# Patient Record
Sex: FEMALE | Race: WHITE | NOT HISPANIC OR LATINO | Employment: FULL TIME | URBAN - METROPOLITAN AREA
[De-identification: names, ages, dates, MRNs, and addresses within clinical notes are randomized per-mention and may not be internally consistent; named-entity substitution may affect disease eponyms.]

---

## 2018-09-26 ENCOUNTER — OFFICE VISIT (OUTPATIENT)
Dept: OBSTETRICS AND GYNECOLOGY | Age: 28
End: 2018-09-26

## 2018-09-26 VITALS
BODY MASS INDEX: 21.59 KG/M2 | SYSTOLIC BLOOD PRESSURE: 110 MMHG | HEIGHT: 56 IN | WEIGHT: 96 LBS | DIASTOLIC BLOOD PRESSURE: 64 MMHG

## 2018-09-26 DIAGNOSIS — Z01.419 ENCOUNTER FOR GYNECOLOGICAL EXAMINATION WITHOUT ABNORMAL FINDING: Primary | ICD-10-CM

## 2018-09-26 PROCEDURE — 99395 PREV VISIT EST AGE 18-39: CPT | Performed by: PHYSICIAN ASSISTANT

## 2018-09-26 RX ORDER — CETIRIZINE HYDROCHLORIDE 10 MG/1
10 TABLET ORAL DAILY
COMMUNITY
End: 2022-12-28

## 2018-09-26 NOTE — PROGRESS NOTES
"Subjective     Chief Complaint   Patient presents with   • Gynecologic Exam     annual exam. last pap 2014 neg/no hpv       History of Present Illness    Lolis Ruiz is a 28 y.o.  who presents for annual exam.    Pt has not been seen since 2016  Mom recently dxed with precancerous cells of her cervix  Prompted pt to call for her appt  No h/o abnormal paps  Never received gardasil  Currently using condoms for BC, doesn't want to do anything else  No med hx  Pt of Dr Howe    Her menses are regular every 28-30 days, lasting 4-7 days, dysmenorrhea none   Obstetric History:  OB History      Para Term  AB Living    2 2 2     2    SAB TAB Ectopic Molar Multiple Live Births              2         Menstrual History:     Patient's last menstrual period was 2018 (exact date).         Current contraception: condoms  History of abnormal Pap smear: no  Received Gardasil immunization: no  Perform regular self breast exam: yes - occl  Family history of uterine or ovarian cancer: no  Family History of colon cancer: no  Family history of breast cancer: yes - MGM in her 60's    Mammogram: not indicated.  Colonoscopy: not indicated.  DEXA: not indicated.    Exercise: moderately active  Calcium/Vitamin D: adequate intake    The following portions of the patient's history were reviewed and updated as appropriate: allergies, current medications, past family history, past medical history, past social history, past surgical history and problem list.    Review of Systems   All other systems reviewed and are negative.      Review of Systems   Constitutional: Negative for fatigue.   Respiratory: Negative for shortness of breath.    Gastrointestinal: Negative for abdominal pain.   Genitourinary: Negative for dysuria.   Neurological: Negative for headaches.   Psychiatric/Behavioral: Negative for dysphoric mood.         Objective   Physical Exam    /64   Ht 142.2 cm (56\")   Wt 43.5 kg (96 lb)   LMP " 09/02/2018 (Exact Date)   Breastfeeding? No   BMI 21.52 kg/m²     General:   alert, appears stated age and cooperative   Neck: no adenopathy and thyroid normal to palpation   Heart: regular rate and rhythm   Lungs: clear to auscultation bilaterally   Abdomen: soft and nontender   Breast: inspection negative, no nipple discharge or bleeding, no masses or nodularity palpable and B dense breasts   Vulva: normal   Vagina: normal mucosa, normal discharge   Cervix: no lesions   Uterus: normal size, non-tender   Adnexa: normal adnexa and no mass, fullness, tenderness   Rectal: not indicated     Assessment/Plan   Lolis was seen today for gynecologic exam.    Diagnoses and all orders for this visit:    Encounter for gynecological examination without abnormal finding  -     Pap IG, Rfx HPV ASCU        All questions answered.  Breast self exam technique reviewed and patient encouraged to perform self-exam monthly.  Discussed healthy lifestyle modifications.  Recommended 30 minutes of aerobic exercise five times per week.  Discussed calcium needs to prevent osteoporosis.      Disc pap guidelines, plan pap today  Disc BC, she is happy with condoms, advised her to add PNV in case of pregnancy  Call for any c/o or if she decides she wants alternative BC

## 2018-09-28 LAB
CONV .: NORMAL
CYTOLOGIST CVX/VAG CYTO: NORMAL
CYTOLOGY CVX/VAG DOC THIN PREP: NORMAL
DX ICD CODE: NORMAL
HIV 1 & 2 AB SER-IMP: NORMAL
OTHER STN SPEC: NORMAL
PATH REPORT.FINAL DX SPEC: NORMAL
STAT OF ADQ CVX/VAG CYTO-IMP: NORMAL

## 2018-10-01 ENCOUNTER — TELEPHONE (OUTPATIENT)
Dept: OBSTETRICS AND GYNECOLOGY | Age: 28
End: 2018-10-01

## 2018-10-26 ENCOUNTER — TELEPHONE (OUTPATIENT)
Dept: OBSTETRICS AND GYNECOLOGY | Age: 28
End: 2018-10-26

## 2018-10-26 RX ORDER — ETONOGESTREL AND ETHINYL ESTRADIOL 11.7; 2.7 MG/1; MG/1
1 INSERT, EXTENDED RELEASE VAGINAL
Qty: 1 EACH | Refills: 10 | Status: SHIPPED | OUTPATIENT
Start: 2018-10-26 | End: 2019-10-26

## 2018-10-26 NOTE — TELEPHONE ENCOUNTER
Please have her check a pregnancy test prior to starting nuvaring.  As long as negative she can start nuvaring with her next menses.  If irregular bleeding continues or other concerns she needs to be seen for evaluation.

## 2018-10-26 NOTE — TELEPHONE ENCOUNTER
Dr SIMS pt, last couple mos pt is having 2 periods a month, requests we send in Nuva ring to pharm on file pt has been on before, allergic to keflex. Please advise, pt states we can leave a msg

## 2019-11-11 ENCOUNTER — OFFICE VISIT (OUTPATIENT)
Dept: OBSTETRICS AND GYNECOLOGY | Age: 29
End: 2019-11-11

## 2019-11-11 VITALS
SYSTOLIC BLOOD PRESSURE: 100 MMHG | BODY MASS INDEX: 21.15 KG/M2 | HEIGHT: 56 IN | WEIGHT: 94 LBS | DIASTOLIC BLOOD PRESSURE: 68 MMHG

## 2019-11-11 DIAGNOSIS — Z01.419 ENCOUNTER FOR GYNECOLOGICAL EXAMINATION (GENERAL) (ROUTINE) WITHOUT ABNORMAL FINDINGS: Primary | ICD-10-CM

## 2019-11-11 PROCEDURE — 99395 PREV VISIT EST AGE 18-39: CPT | Performed by: OBSTETRICS & GYNECOLOGY

## 2019-11-11 NOTE — PROGRESS NOTES
Routine Annual Visit    2019    Patient: Lolis Ruiz          MR#:[id number]      Chief Complaint   Patient presents with   • Gynecologic Exam     AE today.  2018 pap = neg.  Started cycle today (irregular).  Interested in possibly starting OCP again to help control cycles, however, they effect her moods.  Her  has had a consult for a vasectomy.         History of Present Illness    29 y.o. female  who presents for annual exam.   Doing well, reg menses but crampy at times  Wants to do ocps for a couple of months   to get vasectomy  Using condoms  Will do taytulla a couple of months  Doesn't like to stay on them secondary to mood changes  Kids are 12,4 yo    Pap is UTD          Patient's last menstrual period was 2019.  Obstetric History:  OB History      Para Term  AB Living    2 2 2     2    SAB TAB Ectopic Molar Multiple Live Births              2         Menstrual History:     Patient's last menstrual period was 2019.       Sexual History:       ________________________________________  There is no problem list on file for this patient.      Past Medical History:   Diagnosis Date   • Asthma    • Catscratch disease    • Migraine    • Spondylolysis        Past Surgical History:   Procedure Laterality Date   •  SECTION     • WISDOM TOOTH EXTRACTION         Social History     Tobacco Use   Smoking Status Never Smoker   Smokeless Tobacco Never Used       has a current medication list which includes the following prescription(s): cetirizine, azithromycin, and prednisone.  ________________________________________    Current contraception: condoms  History of abnormal Pap smear: no  Family history of Breast cancer: no        The following portions of the patient's history were reviewed and updated as appropriate: allergies, current medications, past family history, past medical history, past social history, past surgical history and problem  "list.    Review of Systems    Pertinent items are noted in HPI.     Objective   Physical Exam    /68   Ht 142.2 cm (56\")   Wt 42.6 kg (94 lb)   LMP 11/11/2019   Breastfeeding? No   BMI 21.07 kg/m²    BP Readings from Last 3 Encounters:   11/11/19 100/68   06/24/19 115/73   09/26/18 110/64      Wt Readings from Last 3 Encounters:   11/11/19 42.6 kg (94 lb)   09/26/18 43.5 kg (96 lb)      BMI: Estimated body mass index is 21.07 kg/m² as calculated from the following:    Height as of this encounter: 142.2 cm (56\").    Weight as of this encounter: 42.6 kg (94 lb).      General:   alert, appears stated age and cooperative   Abdomen: soft, non-tender, without masses or organomegaly   Breast: inspection negative, no nipple discharge or bleeding, no masses or nodularity palpable   Vulva: normal   Vagina: normal mucosa   Cervix: no cervical motion tenderness and no lesions   Uterus: normal size, mobile or non-tender   Adnexa: no mass, fullness, tenderness     Assessment:    1. Normal annual exam   Assessment     ICD-10-CM ICD-9-CM   1. Encounter for gynecological examination (general) (routine) without abnormal findings Z01.419 V72.31     Plan:    Plan       []  PAP done  []  Labs:   []  GC/Chl/TV          Lolis was seen today for gynecologic exam.    Diagnoses and all orders for this visit:    Encounter for gynecological examination (general) (routine) without abnormal findings      Samples taytulla, will call if wants to continue      Counseling:  --Nutrition: Stressed importance of moderation and caloric balance, stressed fresh fruit and vegetables  --Exercise: Stressed the importance of regular exercise. 3-5 times weekly       --Discussed pap smear screening recommendations    "

## 2020-07-09 ENCOUNTER — TELEPHONE (OUTPATIENT)
Dept: OBSTETRICS AND GYNECOLOGY | Age: 30
End: 2020-07-09

## 2020-07-09 RX ORDER — NORETHINDRONE ACETATE AND ETHINYL ESTRADIOL AND FERROUS FUMARATE 1MG-20(24)
1 KIT ORAL DAILY
Qty: 28 TABLET | Refills: 12 | Status: SHIPPED | OUTPATIENT
Start: 2020-07-09 | End: 2021-08-18

## 2020-07-09 NOTE — TELEPHONE ENCOUNTER
(Shyam pt) Pt was given sample of taytulla and advised to call if she liked it for a prescription, she does like it and would like that prescription. Pharmacy is verified.

## 2021-01-19 ENCOUNTER — TELEPHONE (OUTPATIENT)
Dept: OBSTETRICS AND GYNECOLOGY | Age: 31
End: 2021-01-19

## 2021-01-19 NOTE — TELEPHONE ENCOUNTER
Shyam pt    Pt called stating that she has not had her menstrual cycle since 12/16/20. Pt stated that she took 2 hpt and they both were negative. Pt is wondering if she should take take her bcp or hold off. Pt has not taken pill for about four days. Took hpt in last 24 hours. Please advise    [phone]

## 2021-08-18 RX ORDER — NORETHINDRONE ACETATE AND ETHINYL ESTRADIOL AND FERROUS FUMARATE 1MG-20(24)
KIT ORAL
Qty: 28 TABLET | Refills: 12 | Status: SHIPPED | OUTPATIENT
Start: 2021-08-18 | End: 2021-11-30 | Stop reason: SDUPTHER

## 2021-11-30 ENCOUNTER — OFFICE VISIT (OUTPATIENT)
Dept: OBSTETRICS AND GYNECOLOGY | Age: 31
End: 2021-11-30

## 2021-11-30 VITALS
BODY MASS INDEX: 20.38 KG/M2 | DIASTOLIC BLOOD PRESSURE: 68 MMHG | HEIGHT: 56 IN | WEIGHT: 90.6 LBS | SYSTOLIC BLOOD PRESSURE: 120 MMHG

## 2021-11-30 DIAGNOSIS — Z11.51 SCREENING FOR HPV (HUMAN PAPILLOMAVIRUS): ICD-10-CM

## 2021-11-30 DIAGNOSIS — Z12.4 SCREENING FOR CERVICAL CANCER: ICD-10-CM

## 2021-11-30 DIAGNOSIS — Z01.419 ENCOUNTER FOR GYNECOLOGICAL EXAMINATION WITHOUT ABNORMAL FINDING: Primary | ICD-10-CM

## 2021-11-30 PROCEDURE — 99395 PREV VISIT EST AGE 18-39: CPT | Performed by: OBSTETRICS & GYNECOLOGY

## 2021-11-30 RX ORDER — OMEGA-3 FATTY ACIDS/FISH OIL 300-1000MG
CAPSULE ORAL
COMMUNITY

## 2021-11-30 RX ORDER — NORETHINDRONE ACETATE AND ETHINYL ESTRADIOL AND FERROUS FUMARATE 1MG-20(24)
1 KIT ORAL DAILY
Qty: 84 TABLET | Refills: 4 | Status: SHIPPED | OUTPATIENT
Start: 2021-11-30 | End: 2022-12-28

## 2021-11-30 NOTE — PROGRESS NOTES
Routine Annual Visit    2021    Patient: Lolis Ruiz          MR#:[id number]      Chief Complaint   Patient presents with   • Gynecologic Exam     Last Pap 18 (-), No complaints at this time       History of Present Illness    31 y.o. female  who presents for annual exam.   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  Obstetric History:  OB History        2    Para   2    Term   2            AB        Living   2       SAB        IAB        Ectopic        Molar        Multiple        Live Births   2               Menstrual History:     Patient's last menstrual period was 2021.       Sexual History:       ________________________________________  There is no problem list on file for this patient.      Past Medical History:   Diagnosis Date   • Asthma    • Catscratch disease    • Migraine    • Spondylolysis        Past Surgical History:   Procedure Laterality Date   •  SECTION     • WISDOM TOOTH EXTRACTION         Social History     Tobacco Use   Smoking Status Never Smoker   Smokeless Tobacco Never Used       has a current medication list which includes the following prescription(s): cetirizine, ibuprofen, and aurovela .  ________________________________________    Current contraception: OCP (estrogen/progesterone)  History of abnormal Pap smear: no  Family history of Breast cancer: no        The following portions of the patient's history were reviewed and updated as appropriate: allergies, current medications, past family history, past medical history, past social history, past surgical history and problem list.    Review of  "Systems    Pertinent items are noted in HPI.     Objective   Physical Exam    /68   Ht 142.2 cm (56\")   Wt 41.1 kg (90 lb 9.6 oz)   LMP 11/07/2021   Breastfeeding No   BMI 20.31 kg/m²    BP Readings from Last 3 Encounters:   11/30/21 120/68   11/11/19 100/68   06/24/19 115/73      Wt Readings from Last 3 Encounters:   11/30/21 41.1 kg (90 lb 9.6 oz)   11/11/19 42.6 kg (94 lb)   09/26/18 43.5 kg (96 lb)      BMI: Estimated body mass index is 20.31 kg/m² as calculated from the following:    Height as of this encounter: 142.2 cm (56\").    Weight as of this encounter: 41.1 kg (90 lb 9.6 oz).      General:   alert, appears stated age and cooperative   Abdomen: soft, non-tender, without masses or organomegaly   Breast: inspection negative, no nipple discharge or bleeding, no masses or nodularity palpable   Vulva: normal   Vagina: normal mucosa   Cervix: no cervical motion tenderness and no lesions   Uterus: normal size, mobile, non-tender and anteverted   Adnexa: no mass, fullness, tenderness     Assessment:    1. Normal annual exam   Assessment     ICD-10-CM ICD-9-CM   1. Encounter for gynecological examination without abnormal finding  Z01.419 V72.31   2. Screening for cervical cancer  Z12.4 V76.2   3. Screening for HPV (human papillomavirus)  Z11.51 V73.81     Plan:    Plan       [x]  PAP done  []  Labs:   []  GC/Chl/TV          Diagnoses and all orders for this visit:    1. Encounter for gynecological examination without abnormal finding (Primary)    2. Screening for cervical cancer  -     IGP, Apt HPV,rfx 16 / 18,45    3. Screening for HPV (human papillomavirus)  -     IGP, Apt HPV,rfx 16 / 18,45    Other orders  -     norethindrone-ethinyl estradiol-ferrous fumarate (Aurovela 24 FE) 1-20 MG-MCG(24) per tablet; Take 1 tablet by mouth Daily.  Dispense: 84 tablet; Refill: 4            Counseling:  --Nutrition: Stressed importance of moderation and caloric balance, stressed fresh fruit and " vegetables  --Exercise: Stressed the importance of regular exercise. 3-5 times weekly       --Discussed pap smear screening recommendations

## 2021-12-02 LAB
CYTOLOGIST CVX/VAG CYTO: NORMAL
CYTOLOGY CVX/VAG DOC CYTO: NORMAL
CYTOLOGY CVX/VAG DOC THIN PREP: NORMAL
DX ICD CODE: NORMAL
HIV 1 & 2 AB SER-IMP: NORMAL
HPV I/H RISK 4 DNA CVX QL PROBE+SIG AMP: NEGATIVE
OTHER STN SPEC: NORMAL
STAT OF ADQ CVX/VAG CYTO-IMP: NORMAL

## 2022-01-03 ENCOUNTER — OFFICE VISIT (OUTPATIENT)
Dept: NEUROSURGERY | Facility: CLINIC | Age: 32
End: 2022-01-03

## 2022-01-03 VITALS
DIASTOLIC BLOOD PRESSURE: 70 MMHG | SYSTOLIC BLOOD PRESSURE: 114 MMHG | HEIGHT: 57 IN | BODY MASS INDEX: 19.89 KG/M2 | WEIGHT: 92.2 LBS | HEART RATE: 75 BPM | TEMPERATURE: 98.2 F

## 2022-01-03 DIAGNOSIS — G93.5 CHIARI I MALFORMATION: Primary | ICD-10-CM

## 2022-01-03 DIAGNOSIS — G43.009 MIGRAINE WITHOUT AURA AND WITHOUT STATUS MIGRAINOSUS, NOT INTRACTABLE: ICD-10-CM

## 2022-01-03 PROCEDURE — 99203 OFFICE O/P NEW LOW 30 MIN: CPT | Performed by: NEUROLOGICAL SURGERY

## 2022-01-03 RX ORDER — BUTALBITAL, ACETAMINOPHEN AND CAFFEINE 50; 325; 40 MG/1; MG/1; MG/1
1-2 TABLET ORAL EVERY 6 HOURS PRN
COMMUNITY
Start: 2021-12-03 | End: 2022-12-28

## 2022-01-31 ENCOUNTER — HOSPITAL ENCOUNTER (OUTPATIENT)
Dept: MRI IMAGING | Facility: HOSPITAL | Age: 32
Discharge: HOME | End: 2022-01-31

## 2022-01-31 DIAGNOSIS — G93.5 CHIARI I MALFORMATION: ICD-10-CM

## 2022-01-31 PROCEDURE — 72146 MRI CHEST SPINE W/O DYE: CPT

## 2022-01-31 PROCEDURE — 72141 MRI NECK SPINE W/O DYE: CPT

## 2022-02-01 NOTE — PROGRESS NOTES
Subjective   History of Present Illness: Lolis Ruiz is a 31 y.o. female is here today for a follow-up with a new  MRI thoracic/cervical spine to evaluate for a syrinx.  She was recently diagnosed with a type I Chiari malformation.  She has a long history of headaches.  She has both migraine headaches and occasional suboccipital headaches.  She has a history of multiple skeletal muscular injuries from previous sports injuries.  She denies any recent changes in strength or sensation.  She has no new complaints today.    History of Present Illness    The following portions of the patient's history were reviewed and updated as appropriate: allergies, past family history, past medical history, past social history, past surgical history and problem list.    Past Medical History:   Diagnosis Date   • Asthma    • Catscratch disease    • Migraine    • Spondylolysis         Past Surgical History:   Procedure Laterality Date   •  SECTION     • WISDOM TOOTH EXTRACTION            Current Outpatient Medications:   •  butalbital-acetaminophen-caffeine (FIORICET, ESGIC) -40 MG per tablet, Take 1-2 tablets by mouth Every 6 (Six) Hours As Needed., Disp: , Rfl:   •  cetirizine (zyrTEC) 10 MG tablet, Take 10 mg by mouth Daily., Disp: , Rfl:   •  Ibuprofen 200 MG capsule, Take  by mouth., Disp: , Rfl:   •  norethindrone-ethinyl estradiol-ferrous fumarate (Aurovela 24 FE) 1-20 MG-MCG(24) per tablet, Take 1 tablet by mouth Daily., Disp: 84 tablet, Rfl: 4     Allergies   Allergen Reactions   • Doxycycline Other (See Comments)     Hands went numb   • Keflex [Cephalexin] Other (See Comments)     Hands went numb   • Adhesive Tape Irritability        Social History     Socioeconomic History   • Marital status:    Tobacco Use   • Smoking status: Never Smoker   • Smokeless tobacco: Never Used   Vaping Use   • Vaping Use: Never used        Family History   Problem Relation Age of Onset   • No Known Problems Mother   "  • No Known Problems Father    • No Known Problems Sister    • No Known Problems Brother    • No Known Problems Daughter    • No Known Problems Son    • No Known Problems Maternal Grandmother    • No Known Problems Paternal Grandmother    • No Known Problems Maternal Aunt    • No Known Problems Paternal Aunt         Review of Systems   Eyes: Positive for visual disturbance.   Respiratory: Negative for chest tightness.    Cardiovascular: Negative for chest pain and leg swelling.   Genitourinary: Negative for difficulty urinating and urgency.   Musculoskeletal: Positive for back pain and gait problem.   Neurological: Positive for dizziness, facial asymmetry, weakness (leg weakness ), light-headedness, numbness and headaches.       Objective     Vitals:    22 1306   BP: 104/68   Pulse: 72   Temp: 98.4 °F (36.9 °C)   Weight: 42.5 kg (93 lb 12.8 oz)   Height: 144.8 cm (57\")     Body mass index is 20.3 kg/m².      Physical Exam  Neurologic Exam  Awake, alert, oriented x3  Pupils equal round reactive to light  Extraocular muscles intact  Face symmetric  Speech is fluent and clear  No pronator drift  Motor exam  Bilateral deltoids 5/5, bilateral biceps 5/5, bilateral triceps 5/5, bilateral wrist extension 5/5 bilateral hand  5/5  Bilateral hip flexion 5/5, bilateral knee extension 5/5, bilateral DF/PF 5/5  No clonus  No Kristen's reflex  Steady normal gait  Able to detect  light touch in all 4 extremities        Assessment/Plan   Independent Review of Radiographic Studies:      I personally reviewed the images from the following studies.  MRI of the cervical and thoracic spine from anywhere 2022  The MRI of the cervical spine shows a type I Chiari malformation with 2 to 3 mm of tonsillar descent.  There is no evidence of syrinx in the cervical or thoracic spine.    Medical Decision Makin-year-old female with a type I Chiari malformation.  -The follow-up MRI of her cervical and thoracic spine show no " evidence of syrinx.  The MRI of her cervical spine show a type I Chiari malformation with only 2 to 3 mm of tonsillar descent.  The tonsils do not appear Peg shaped and there is no evidence of syrinx.  This is essentially a fairly normal-appearing MRI.  I think her headaches are unrelated to the Chiari  Malformation.  -She also reports she has a known spondylolisthesis as well as back pain and lower extremity pain.  She has undergone physical therapy at times for this pain.  She would like to hold off from imaging at this time, but I have asked her to call back if she develops any lower extremity pain, sensory loss, weakness and we will consider a MRI and CT of her lumbar spine    Diagnoses and all orders for this visit:    1. Chiari I malformation (HCC) (Primary)  -     MRI Cervical Spine Without Contrast; Future      Return in about 1 year (around 2/9/2023).

## 2022-02-09 ENCOUNTER — OFFICE VISIT (OUTPATIENT)
Dept: NEUROSURGERY | Facility: CLINIC | Age: 32
End: 2022-02-09

## 2022-02-09 VITALS
DIASTOLIC BLOOD PRESSURE: 68 MMHG | SYSTOLIC BLOOD PRESSURE: 104 MMHG | BODY MASS INDEX: 20.24 KG/M2 | TEMPERATURE: 98.4 F | WEIGHT: 93.8 LBS | HEIGHT: 57 IN | HEART RATE: 72 BPM

## 2022-02-09 DIAGNOSIS — G93.5 CHIARI I MALFORMATION: Primary | ICD-10-CM

## 2022-02-09 PROCEDURE — 99214 OFFICE O/P EST MOD 30 MIN: CPT | Performed by: NEUROLOGICAL SURGERY

## 2022-03-21 ENCOUNTER — OFFICE VISIT (OUTPATIENT)
Dept: NEUROLOGY | Facility: CLINIC | Age: 32
End: 2022-03-21

## 2022-03-21 VITALS
BODY MASS INDEX: 20.17 KG/M2 | DIASTOLIC BLOOD PRESSURE: 70 MMHG | HEART RATE: 68 BPM | RESPIRATION RATE: 16 BRPM | WEIGHT: 93.5 LBS | SYSTOLIC BLOOD PRESSURE: 118 MMHG | HEIGHT: 57 IN

## 2022-03-21 DIAGNOSIS — G43.109 MIGRAINE WITH AURA AND WITHOUT STATUS MIGRAINOSUS, NOT INTRACTABLE: Primary | ICD-10-CM

## 2022-03-21 DIAGNOSIS — R51.9 OCCIPITAL HEADACHE: ICD-10-CM

## 2022-03-21 PROCEDURE — 99203 OFFICE O/P NEW LOW 30 MIN: CPT | Performed by: STUDENT IN AN ORGANIZED HEALTH CARE EDUCATION/TRAINING PROGRAM

## 2022-03-21 RX ORDER — PROPRANOLOL HYDROCHLORIDE 20 MG/1
20 TABLET ORAL 2 TIMES DAILY
Qty: 60 TABLET | Refills: 2 | Status: SHIPPED | OUTPATIENT
Start: 2022-03-21 | End: 2022-12-28

## 2022-03-21 RX ORDER — RIZATRIPTAN BENZOATE 10 MG/1
10 TABLET ORAL ONCE AS NEEDED
Qty: 12 TABLET | Refills: 2 | Status: SHIPPED | OUTPATIENT
Start: 2022-03-21 | End: 2022-08-24

## 2022-03-21 NOTE — PROGRESS NOTES
Chief Complaint   Patient presents with   • Migraine     Since she been a child ,she states they got worth in November 2021 and thinks she has problems because of covid        Patient ID: Lolis Ruiz is a 31 y.o. female.    HPI:    The following portions of the patient's history were reviewed and updated as appropriate: allergies, current medications, past family history, past medical history, past social history, past surgical history and problem list.    Review of Systems   Constitutional: Positive for activity change, chills and fatigue.   HENT: Negative for dental problem, ear pain, nosebleeds, tinnitus and trouble swallowing.    Eyes: Positive for photophobia and visual disturbance.   Respiratory: Negative for choking, shortness of breath and wheezing.    Cardiovascular: Negative for chest pain, palpitations and leg swelling.   Gastrointestinal: Negative for abdominal pain, constipation and rectal pain.   Endocrine: Negative for cold intolerance and heat intolerance.   Genitourinary: Negative for difficulty urinating, hematuria, pelvic pain and vaginal pain.   Musculoskeletal: Positive for back pain, gait problem, neck pain and neck stiffness.   Allergic/Immunologic: Negative for food allergies.   Neurological: Positive for headaches. Negative for dizziness, tremors, seizures, weakness and light-headedness.   Psychiatric/Behavioral: Positive for agitation and sleep disturbance. Negative for behavioral problems and decreased concentration. The patient is not hyperactive.     Ms. Ruiz is a 32 yo with Chiari 1 malformation and spondylosis that presents with  Has had a headache every day since COVID infection September, starting November 2021  Takes fioricet and ibuprofen for headaches. Works as an  for orthopedics.  Migraine  Onset 11 years of age  Aura - once 15 minutes before had a feeling  like a rubber band snapping, then saw half moon with balls white and black alternating colors and  moving.  Location Right>left bilateral occipital moves over the sides to settle behind the eyes   Duration if medicine taken they go away quickly. Up to a day  ASSOCIATED SYMPTOMS: as a child had nosebleeds but not now. Nausea, and vomited. Worse with light and sound. Blurry vision around the edges with return to normal, had Lasix.   Alleviating/exacerbating - Uses blue light filter.   Pain character - stabbing throbbing pain  Has kidney stones.  Has history of asthma, last used inhaler.  No HTN, HLD, DM, CAD, MI, stroke.    Has SVT, PACs, PVCs. Has had episodes of chest pain with negative troponins in the past.  Recently stopped oral contraceptive -  getting vasectomy  Family hx of migraines.  Prior medications imitrex, TPM, GBP, possibly elavil - nothing helped, metoprolol 10mg  Days per month, 30 days a month of headache, with 12 days severe a month in November, now down to 4-8 in a month. Previously 4 a year.   Tried Ubrelvy samples 1, 50mg and 1 100mg tablet. They did not help as an abortive.  Vitals:    03/21/22 1451   BP: 118/70   Pulse: 68   Resp: 16       Neurologic Exam     Mental Status   Attention: normal. Concentration: normal.   Speech: speech is normal   Level of consciousness: alert    Cranial Nerves     CN II   Visual fields full to confrontation.   Visual acuity: normal    CN III, IV, VI   Pupils are equal, round, and reactive to light.  Extraocular motions are normal.     CN V   Facial sensation intact.     CN VII   Facial expression full, symmetric.     CN VIII   Hearing: intact    CN IX, X   Palate: symmetric    CN XI   Right trapezius strength: normal  Left trapezius strength: normal    CN XII   Tongue: not atrophic  Fasciculations: absent  Tongue deviation: none    Motor Exam   Muscle bulk: normal    Strength   Right deltoid: 5/5  Left deltoid: 5/5  Right biceps: 5/5  Left biceps: 5/5  Right triceps: 5/5  Left triceps: 5/5  Right iliopsoas: 5/5  Left iliopsoas: 5/5  Right quadriceps:  5/5  Left quadriceps: 5/5  Right hamstrin/5  Left hamstrin/5  Right anterior tibial: 5/5  Left anterior tibial: 5/5  Right gastroc: 5/5  Left gastroc: 5/5Grip 5 out of 5 bilaterally     Sensory Exam   Right arm light touch: normal  Left arm light touch: normal  Right leg light touch: normal  Left leg light touch: normal    Gait, Coordination, and Reflexes     Coordination   Finger to nose coordination: normal  Heel to shin coordination: normal    Reflexes   Right brachioradialis: 2+  Left brachioradialis: 2+  Right biceps: 2+  Left biceps: 2+  Right patellar: 2+  Left patellar: 2+  Right achilles: 2+  Left achilles: 2+  Right plantar: equivocal  Left plantar: equivocal      Physical Exam  Eyes:      Extraocular Movements: EOM normal.      Pupils: Pupils are equal, round, and reactive to light.   Neurological:      Coordination: Finger-Nose-Finger Test and Heel to Shin Test normal.      Deep Tendon Reflexes:      Reflex Scores:       Bicep reflexes are 2+ on the right side and 2+ on the left side.       Brachioradialis reflexes are 2+ on the right side and 2+ on the left side.       Patellar reflexes are 2+ on the right side and 2+ on the left side.       Achilles reflexes are 2+ on the right side and 2+ on the left side.  Psychiatric:         Speech: Speech normal.         Procedures    Assessment/Plan:    Patient with migraine headaches, increased in frequency. The headaches are occipital in location and she would benefit potentially from GONB    Plan  - maxalt 10mg for abortive  - discussed headache hygiene  - propranolol 20mg BID for prophylaxis, no asthma inhaler usage for 10 years.  -Also discussed venlafaxine 75mg whic may be a viable option if propranolol is not effective in the future.  -plan for trial of GONB given occipital onset of headaches     Diagnoses and all orders for this visit:    1. Migraine with aura and without status migrainosus, not intractable (Primary)    Other orders  -      rizatriptan (Maxalt) 10 MG tablet; Take 1 tablet by mouth 1 (One) Time As Needed for Migraine for up to 90 days. May repeat in 2 hours if needed  Dispense: 12 tablet; Refill: 2  -     propranolol (INDERAL) 20 MG tablet; Take 1 tablet by mouth 2 (Two) Times a Day for 90 days.  Dispense: 60 tablet; Refill: 2    I spent 39 minutes caring for this patient on this date of service. This time includes time spent by me in the following activities as necessary: preparing for the visit, reviewing tests, medical records and previous visits, obtaining and/or reviewing a separately obtained history, performing a medically appropriate exam and/or evaluation, counseling and educating the patient, and/or communicating with other healthcare professionals, documenting information in the medical record, independently interpreting results and communicating that information with the patient, and developing a medically appropriate treatment plan with consideration of other conditions, medications, and treatments.  Return in about 2 months (around 5/21/2022) for recheck and 3 month follow up on friday for GONB.         Tommie Rodriguez MD

## 2022-03-30 ENCOUNTER — TELEPHONE (OUTPATIENT)
Dept: NEUROLOGY | Facility: CLINIC | Age: 32
End: 2022-03-30

## 2022-03-30 NOTE — TELEPHONE ENCOUNTER
Caller: IRMA  Relationship: SELF     What medications are you currently taking:   Current Outpatient Medications on File Prior to Visit   Medication Sig Dispense Refill   • butalbital-acetaminophen-caffeine (FIORICET, ESGIC) -40 MG per tablet Take 1-2 tablets by mouth Every 6 (Six) Hours As Needed.     • cetirizine (zyrTEC) 10 MG tablet Take 10 mg by mouth Daily.     • Ibuprofen 200 MG capsule Take  by mouth.     • norethindrone-ethinyl estradiol-ferrous fumarate (Aurovela 24 FE) 1-20 MG-MCG(24) per tablet Take 1 tablet by mouth Daily. 84 tablet 4   • propranolol (INDERAL) 20 MG tablet Take 1 tablet by mouth 2 (Two) Times a Day for 90 days. 60 tablet 2   • rizatriptan (Maxalt) 10 MG tablet Take 1 tablet by mouth 1 (One) Time As Needed for Migraine for up to 90 days. May repeat in 2 hours if needed 12 tablet 2     No current facility-administered medications on file prior to visit.              Which medication are you concerned about: propranolol (INDERAL) 20 MG tablet, rizatriptan (Maxalt) 10 MG tablet    Who prescribed you this medication:DR. GRAHAM    What are your concerns: PT IS HAVING SUPER WEIRD VIVDE DREAMS, DON'T KNOW WHICH MEDICATIONS IT IS. SHE ALSO STATING THAT THE MEDICATIONS  IS NOT HELPING AND WANTS TO KNOW IF SHE CAN TAKE IBUPROFEN.    LOST CONNECTION WITH PT    PLEASE CALL HER BACK [phone]

## 2022-03-31 ENCOUNTER — DOCUMENTATION (OUTPATIENT)
Dept: NEUROLOGY | Facility: CLINIC | Age: 32
End: 2022-03-31

## 2022-03-31 NOTE — TELEPHONE ENCOUNTER
I called the Patient and gave her Dr Hankins instructions and told her to follow in with in 3 month to see how she is doing ,Zamzam Perez

## 2022-04-05 ENCOUNTER — PATIENT ROUNDING (BHMG ONLY) (OUTPATIENT)
Dept: NEUROLOGY | Facility: CLINIC | Age: 32
End: 2022-04-05

## 2022-04-22 ENCOUNTER — TELEPHONE (OUTPATIENT)
Dept: NEUROLOGY | Facility: CLINIC | Age: 32
End: 2022-04-22

## 2022-04-22 NOTE — TELEPHONE ENCOUNTER
Spoke to Estimation team and they told me that the could not give me the price for a Nerve block and would e-mail a lady with the Name Vi to tell me what this will coast inflammatory bowel disease will inform the patient about the coast as soon as we have it figured out <judit Perez

## 2022-04-22 NOTE — TELEPHONE ENCOUNTER
----- Message from Anders Arroyo sent at 4/22/2022 10:55 AM EDT -----  Have you submitted a prior authorization for the ONB and received a denial? Im not sure how much the patient would have to pay out of pocket as we cant run estimates anymore. You will need to contact billing I assume to get this estimate. [phone].    Naomie CARDENAS   ----- Message -----  From: Moncho Perez MA  Sent: 4/21/2022   8:27 AM EDT  To: Anders Arroyo    She called the Billing department and was told that and OCC block by Dr Rodriguez would coast her 22.500 dollars ,please tell me how much would she pay out of her own pocket ? She states her Insurance will not pay for this at all ,Thanks judit

## 2022-05-23 ENCOUNTER — OFFICE VISIT (OUTPATIENT)
Dept: NEUROLOGY | Facility: CLINIC | Age: 32
End: 2022-05-23

## 2022-05-23 VITALS
SYSTOLIC BLOOD PRESSURE: 126 MMHG | WEIGHT: 93.47 LBS | BODY MASS INDEX: 20.17 KG/M2 | HEART RATE: 62 BPM | DIASTOLIC BLOOD PRESSURE: 70 MMHG | HEIGHT: 57 IN | RESPIRATION RATE: 16 BRPM

## 2022-05-23 DIAGNOSIS — R51.9 OCCIPITAL HEADACHE: Primary | ICD-10-CM

## 2022-05-23 DIAGNOSIS — G43.109 MIGRAINE WITH AURA AND WITHOUT STATUS MIGRAINOSUS, NOT INTRACTABLE: ICD-10-CM

## 2022-05-23 PROCEDURE — 99213 OFFICE O/P EST LOW 20 MIN: CPT | Performed by: STUDENT IN AN ORGANIZED HEALTH CARE EDUCATION/TRAINING PROGRAM

## 2022-05-23 NOTE — PROGRESS NOTES
Chief Complaint   Patient presents with   • Migraine     Patient doing little better       Patient ID: Lolis Ruiz is a 31 y.o. female.    HPI:    The following portions of the patient's history were reviewed and updated as appropriate: allergies, current medications, past family history, past medical history, past social history, past surgical history and problem list.    Interval history:    Review of Systems   Constitutional: Negative for activity change, chills and fever.   HENT: Negative for ear pain, mouth sores and sinus pain.    Eyes: Positive for photophobia and visual disturbance.   Respiratory: Negative for chest tightness, shortness of breath and wheezing.    Cardiovascular: Negative for chest pain, palpitations and leg swelling.   Gastrointestinal: Negative for anal bleeding, diarrhea and vomiting.   Endocrine: Positive for cold intolerance and heat intolerance.   Genitourinary: Negative for difficulty urinating, frequency and urgency.   Musculoskeletal: Positive for neck pain and neck stiffness. Negative for back pain.   Allergic/Immunologic: Positive for food allergies.   Neurological: Positive for dizziness, light-headedness and headaches.   Psychiatric/Behavioral: Positive for agitation and decreased concentration. Negative for sleep disturbance. The patient is not nervous/anxious.       Took vacation since last appointment. Was at 4-8 headache days a month. Has been taking riboflavin and ibuprofen.  had vasectomy. Promoted at work. Off of birth control. Daily headaches have reduced some but 3 to 4 headaches with 1-2 migraines a week. Started on propranolol 20mg BID and maxalt 10mg for abortive but had nightmares on propranolol and was not effective.   Ibuprofen. Had super painful headache at the back of the head on right and later had an event with standing up on the left side of the headache.         Water intake - uses a timed bottle.  Sleep - Sleeps okay, at least 7 hours a  night  Exercise - runs with his son, doing more than she used to    Patient with   Continue magnesium oxide 500mg daily and ribofloven  - discussed headache hygiene  -ibuprofen 800mg (pt takes closer to 600mg) less than two to 3 times a day.   -Nurtec 75mg every other day.  Patient has tried and failed due to side effects or ineffectiveness the following preventative medicines, gabapentin, topiramate, and propranolol.  She is also failed the following abortives of Imitrex and Maxalt.  -recommend talking to Dr. Colbert regarding safety of scuba diving from a [place] standpoint.  From the standpoint of migraine headaches I do not see any clear contraindication to scuba diving though I advised her to avoid rapid Ascent after diving a deep amount.  Vitals:    22 1456   BP: 126/70   Pulse: 62   Resp: 16       Neurologic Exam     Mental Status   Attention: normal. Concentration: normal.   Speech: speech is normal   Level of consciousness: alert    Cranial Nerves     CN II   Visual fields full to confrontation.   Visual acuity: normal    CN III, IV, VI   Pupils are equal, round, and reactive to light.  Extraocular motions are normal.     CN V   Facial sensation intact.     CN VII   Facial expression full, symmetric.     CN VIII   Hearing: intact    CN IX, X   Palate: symmetric    CN XI   Right trapezius strength: normal  Left trapezius strength: normal    CN XII   Tongue: not atrophic  Fasciculations: absent  Tongue deviation: none    Motor Exam   Muscle bulk: normal    Strength   Right deltoid: 5/5  Left deltoid: 5/5  Right biceps: 5/5  Left biceps: 5/5  Right triceps: 5/5  Left triceps: 5/5  Right iliopsoas: 5/5  Left iliopsoas: 5/5  Right quadriceps: 5/5  Left quadriceps: 5/5  Right hamstrin/5  Left hamstrin/5  Right anterior tibial: 5/5  Left anterior tibial: 5/5  Right gastroc: 5/5  Left gastroc: 5/5Grip 5 out of 5 bilaterally     Sensory Exam   Right arm light touch: normal  Left arm light touch:  normal  Right leg light touch: normal  Left leg light touch: normal    Gait, Coordination, and Reflexes     Coordination   Finger to nose coordination: normal  Heel to shin coordination: normal    Reflexes   Right brachioradialis: 2+  Left brachioradialis: 2+  Right biceps: 2+  Left biceps: 2+  Right patellar: 2+  Left patellar: 2+  Right achilles: 2+  Left achilles: 2+  Right plantar: equivocal  Left plantar: equivocal      Physical Exam  Eyes:      Extraocular Movements: EOM normal.      Pupils: Pupils are equal, round, and reactive to light.   Neurological:      Coordination: Finger-Nose-Finger Test and Heel to Shin Test normal.      Deep Tendon Reflexes:      Reflex Scores:       Bicep reflexes are 2+ on the right side and 2+ on the left side.       Brachioradialis reflexes are 2+ on the right side and 2+ on the left side.       Patellar reflexes are 2+ on the right side and 2+ on the left side.       Achilles reflexes are 2+ on the right side and 2+ on the left side.  Psychiatric:         Speech: Speech normal.         Procedures    Assessment/Plan:    Patient with migraine headaches, increased in frequency. The headaches are occipital in location and she would benefit potentially from GONB    -trial of nurtec 75mg every other day as preventative. Prior medications include imitrex, topiramate, gabapentin, propranolol, possibly elavil, but nothing helped  -will investigate further cost of GONB as it appears insurance may not pay for it.    Return in about 3 months (around 8/23/2022).  I spent 20 minutes caring for this patient on this date of service. This time includes time spent by me in the following activities as necessary: preparing for the visit, reviewing tests, medical records and previous visits, obtaining and/or reviewing a separately obtained history, performing a medically appropriate exam and/or evaluation, counseling and educating the patient, and/or communicating with other healthcare  professionals, documenting information in the medical record, independently interpreting results and communicating that information with the patient, and developing a medically appropriate treatment plan with consideration of other conditions, medications, and treatments.          d   There are no diagnoses linked to this encounter.       Moncho Perez MA

## 2022-06-02 ENCOUNTER — TELEPHONE (OUTPATIENT)
Dept: NEUROLOGY | Facility: CLINIC | Age: 32
End: 2022-06-02

## 2022-06-02 NOTE — TELEPHONE ENCOUNTER
I called Kell with [employer] and initiated the prior authorization for the Patient   Nurtec 75mg#11   The Lady from [employer] told me that it can take up to 24 hours for us to get the approval back ,judit OJEDA    Patient was called and informed that Prior Authoriztion is on 24hour hold ,judit OJEDA

## 2022-06-06 NOTE — TELEPHONE ENCOUNTER
Patient was called and notified that her [employer] had denied her Medication but Dr Rodriguez is doing a appeal letter to get this approved.   The  patient thank us for keeping her posted about the Medication ,judit OJEDA

## 2022-06-08 NOTE — TELEPHONE ENCOUNTER
Caller: [employer] (NEW ADDRESS) - [address] AT PREVIOUSLY: [address] [facility] [phone] [facility] [phone]      Pharmacy representative's name: REGINA    Relationship: Pharmacy    Best call back number: [phone]    What was the call regarding: [facility]. I ADVISED NURTEC PA WAS DENIED AND WE ARE WORKING ON APPEAL.    Do you require a callback: NO.    DOCUMENTING PER [employer].

## 2022-06-22 ENCOUNTER — TELEPHONE (OUTPATIENT)
Dept: NEUROLOGY | Facility: CLINIC | Age: 32
End: 2022-06-22

## 2022-06-22 NOTE — TELEPHONE ENCOUNTER
Spoke with patient and scheduled ONB patient will call Billing for prices if Insurance does not cover the cost

## 2022-06-23 NOTE — TELEPHONE ENCOUNTER
Called the Patient after she called here stating that her Insurance needs to have her Nerve block pre-certed . I called her anthem again  [phone] and spoke to Thania MCLAUGHLIN and it does not need to be pre-certed because this is a none-covered benefit with her insurance .I ask nuno for some info and she called our billing department so they can give us an estimate . I told Mrs Ruiz I will call her back ,judit Perez CCM

## 2022-06-23 NOTE — TELEPHONE ENCOUNTER
Spoke to  emile OCC nerve block is not a covered benefit so Billing estimated 1$756 and Mrs Ruiz decided for right now to wait and see if we will come up with something different ,judit Perez

## 2022-06-23 NOTE — TELEPHONE ENCOUNTER
Caller: Lolis Ruiz    Relationship: Self    Best call back number: [phone]    What was the call regarding: PT CALLED AS SHE GOT A CALL FROM PADDY YESTERDAY TO SCHEDULE HER ONB INJECTIONS WITH DR. GRAHAM. PT HAS BEEN SCHEDULED FOR TOMORROW, 6/24/22. PT STATES THAT SHE WAS INFORMED THAT NO PA WAS NECESSARY BUT WHEN SHE SPOKE WITH HER INSURANCE YESTERDAY, SHE WAS INFORMED THAT HER INSURANCE DOES REQUIRE PA. PT STATES THAT SHE WAS INFORMED THAT OFFICE WOULD BE WORKING ON A PA APPEAL BUT HAS NOT HEARD BACK IN THESE REGARDS. PT NEEDING TO KNOW IF OKAY TO KEEP ONB APPT TOMORROW OR IF IT WOULD BE BEST TO PUSH THE APPT OUT UNTIL HEARING BACK FROM HER INSURANCE.    Do you require a callback: YES, PLEASE.    PLEASE REVIEW AND ADVISE.

## 2022-06-24 ENCOUNTER — TELEPHONE (OUTPATIENT)
Dept: NEUROLOGY | Facility: CLINIC | Age: 32
End: 2022-06-24

## 2022-06-24 NOTE — TELEPHONE ENCOUNTER
Called the patient today after Kell Iglesias our Supervisor talked to Billing and was now told that the coast was only $57.    Patient states she needs to have something in writing for this and Kell is one more time looking into if that is possible ,Zamzam Perez

## 2022-08-24 ENCOUNTER — OFFICE VISIT (OUTPATIENT)
Dept: NEUROLOGY | Facility: CLINIC | Age: 32
End: 2022-08-24

## 2022-08-24 VITALS
HEART RATE: 58 BPM | WEIGHT: 95 LBS | BODY MASS INDEX: 20.49 KG/M2 | DIASTOLIC BLOOD PRESSURE: 78 MMHG | SYSTOLIC BLOOD PRESSURE: 112 MMHG | RESPIRATION RATE: 14 BRPM | HEIGHT: 57 IN

## 2022-08-24 DIAGNOSIS — R55 SYNCOPE AND COLLAPSE: Primary | ICD-10-CM

## 2022-08-24 PROCEDURE — 99213 OFFICE O/P EST LOW 20 MIN: CPT | Performed by: STUDENT IN AN ORGANIZED HEALTH CARE EDUCATION/TRAINING PROGRAM

## 2022-08-29 ENCOUNTER — HOSPITAL ENCOUNTER (OUTPATIENT)
Dept: NEUROLOGY | Facility: HOSPITAL | Age: 32
Discharge: HOME | End: 2022-08-29
Admitting: STUDENT IN AN ORGANIZED HEALTH CARE EDUCATION/TRAINING PROGRAM

## 2022-08-29 DIAGNOSIS — R55 SYNCOPE AND COLLAPSE: ICD-10-CM

## 2022-08-29 PROCEDURE — 95813 EEG EXTND MNTR 61-119 MIN: CPT | Performed by: STUDENT IN AN ORGANIZED HEALTH CARE EDUCATION/TRAINING PROGRAM

## 2022-08-29 PROCEDURE — 95813 EEG EXTND MNTR 61-119 MIN: CPT

## 2022-08-31 ENCOUNTER — TELEPHONE (OUTPATIENT)
Dept: NEUROLOGY | Facility: CLINIC | Age: 32
End: 2022-08-31

## 2022-08-31 NOTE — TELEPHONE ENCOUNTER
Provider: DR. GRAHAM  Caller: IRMA CABRERA  Relationship to Patient: SELF    Reason for Call: DR. GRAHAM WANTED HER TO HAVE A ECHO AND SHE SEEN HER CARDIOLOGIST DR. CRISTOBAL WITH [facility] TODAY AND THEY CAN GET HER IN EARLIER FOR THE ECHO AND NEED THE ORDER FAXED TO THEM [phone] ATTN:[email or web address].. SHE IS WANTING TO KNOW IF THE EEG RESULTS ARE BACK WHEN SHE HAD IT DONE 8-29-22 AT  OFF OF [address] [place].    PLEASE CALL HER BACK -[phone]

## 2022-08-31 NOTE — TELEPHONE ENCOUNTER
Send the Request to Lilly to fax to [phone] Dr Max when pre-cert is done so she can have the Echo done faster ,Zamzam Perez CCM

## 2022-10-06 ENCOUNTER — HOSPITAL ENCOUNTER (OUTPATIENT)
Dept: CARDIOLOGY | Facility: HOSPITAL | Age: 32
Discharge: HOME | End: 2022-10-06
Admitting: STUDENT IN AN ORGANIZED HEALTH CARE EDUCATION/TRAINING PROGRAM

## 2022-10-06 VITALS
HEIGHT: 57 IN | BODY MASS INDEX: 20.49 KG/M2 | SYSTOLIC BLOOD PRESSURE: 116 MMHG | WEIGHT: 95 LBS | DIASTOLIC BLOOD PRESSURE: 80 MMHG

## 2022-10-06 DIAGNOSIS — R55 SYNCOPE AND COLLAPSE: ICD-10-CM

## 2022-10-06 LAB
AORTIC DIMENSIONLESS INDEX: 0.9 (DI)
BH CV ECHO MEAS - AO MAX PG: 6 MMHG
BH CV ECHO MEAS - AO MEAN PG: 3.4 MMHG
BH CV ECHO MEAS - AO ROOT DIAM: 2.43 CM
BH CV ECHO MEAS - AO V2 MAX: 122.5 CM/SEC
BH CV ECHO MEAS - AO V2 VTI: 26.6 CM
BH CV ECHO MEAS - AVA(I,D): 1.96 CM2
BH CV ECHO MEAS - EDV(CUBED): 63.7 ML
BH CV ECHO MEAS - EDV(MOD-SP2): 72 ML
BH CV ECHO MEAS - EDV(MOD-SP4): 63 ML
BH CV ECHO MEAS - EF(MOD-BP): 65.2 %
BH CV ECHO MEAS - EF(MOD-SP2): 68.1 %
BH CV ECHO MEAS - EF(MOD-SP4): 65.1 %
BH CV ECHO MEAS - ESV(CUBED): 11 ML
BH CV ECHO MEAS - ESV(MOD-SP2): 23 ML
BH CV ECHO MEAS - ESV(MOD-SP4): 22 ML
BH CV ECHO MEAS - FS: 44.3 %
BH CV ECHO MEAS - IVS/LVPW: 0.89 CM
BH CV ECHO MEAS - IVSD: 0.67 CM
BH CV ECHO MEAS - LAT PEAK E' VEL: 15.4 CM/SEC
BH CV ECHO MEAS - LV DIASTOLIC VOL/BSA (35-75): 48.1 CM2
BH CV ECHO MEAS - LV MASS(C)D: 79.7 GRAMS
BH CV ECHO MEAS - LV MAX PG: 6 MMHG
BH CV ECHO MEAS - LV MEAN PG: 2.5 MMHG
BH CV ECHO MEAS - LV SYSTOLIC VOL/BSA (12-30): 16.8 CM2
BH CV ECHO MEAS - LV V1 MAX: 122.2 CM/SEC
BH CV ECHO MEAS - LV V1 VTI: 23 CM
BH CV ECHO MEAS - LVIDD: 4 CM
BH CV ECHO MEAS - LVIDS: 2.22 CM
BH CV ECHO MEAS - LVOT AREA: 2.26 CM2
BH CV ECHO MEAS - LVOT DIAM: 1.7 CM
BH CV ECHO MEAS - LVPWD: 0.75 CM
BH CV ECHO MEAS - MED PEAK E' VEL: 13.5 CM/SEC
BH CV ECHO MEAS - MR MAX PG: 57.5 MMHG
BH CV ECHO MEAS - MR MAX VEL: 379.1 CM/SEC
BH CV ECHO MEAS - MV A DUR: 0.08 SEC
BH CV ECHO MEAS - MV A MAX VEL: 39 CM/SEC
BH CV ECHO MEAS - MV DEC SLOPE: 340.4 CM/SEC2
BH CV ECHO MEAS - MV DEC TIME: 0.21 MSEC
BH CV ECHO MEAS - MV E MAX VEL: 77.1 CM/SEC
BH CV ECHO MEAS - MV E/A: 1.98
BH CV ECHO MEAS - MV MAX PG: 3.6 MMHG
BH CV ECHO MEAS - MV MEAN PG: 1.67 MMHG
BH CV ECHO MEAS - MV P1/2T: 80.7 MSEC
BH CV ECHO MEAS - MV V2 VTI: 26.7 CM
BH CV ECHO MEAS - MVA(P1/2T): 2.7 CM2
BH CV ECHO MEAS - MVA(VTI): 1.95 CM2
BH CV ECHO MEAS - PA ACC TIME: 0.12 SEC
BH CV ECHO MEAS - PA PR(ACCEL): 23.1 MMHG
BH CV ECHO MEAS - PA V2 MAX: 97.3 CM/SEC
BH CV ECHO MEAS - RAP SYSTOLE: 8 MMHG
BH CV ECHO MEAS - RV MAX PG: 2.12 MMHG
BH CV ECHO MEAS - RV V1 MAX: 72.8 CM/SEC
BH CV ECHO MEAS - RV V1 VTI: 15.7 CM
BH CV ECHO MEAS - RVSP: 29.7 MMHG
BH CV ECHO MEAS - SI(MOD-SP2): 37.4 ML/M2
BH CV ECHO MEAS - SI(MOD-SP4): 31.3 ML/M2
BH CV ECHO MEAS - SV(LVOT): 52 ML
BH CV ECHO MEAS - SV(MOD-SP2): 49 ML
BH CV ECHO MEAS - SV(MOD-SP4): 41 ML
BH CV ECHO MEAS - TAPSE (>1.6): 2.6 CM
BH CV ECHO MEAS - TR MAX PG: 21.7 MMHG
BH CV ECHO MEAS - TR MAX VEL: 232.9 CM/SEC
BH CV ECHO MEASUREMENTS AVERAGE E/E' RATIO: 5.34
BH CV XLRA - RV BASE: 2.22 CM
BH CV XLRA - RV LENGTH: 5.7 CM
BH CV XLRA - RV MID: 1.85 CM
BH CV XLRA - TDI S': 13.8 CM/SEC
LEFT ATRIUM VOLUME INDEX: 15.3 ML/M2
LV EF 2D ECHO EST: 65 %
MAXIMAL PREDICTED HEART RATE: 187 BPM
STRESS TARGET HR: 159 BPM

## 2022-10-06 PROCEDURE — 93306 TTE W/DOPPLER COMPLETE: CPT

## 2022-10-06 PROCEDURE — 93306 TTE W/DOPPLER COMPLETE: CPT | Performed by: INTERNAL MEDICINE

## 2022-10-06 NOTE — PROGRESS NOTES
Trace to mild tricuspid valve regurgitation but otherwise normal. Can discuss further at next appt but no intervention needed, no large abnormalities.

## 2022-12-28 ENCOUNTER — OFFICE VISIT (OUTPATIENT)
Dept: NEUROLOGY | Facility: CLINIC | Age: 32
End: 2022-12-28
Payer: COMMERCIAL

## 2022-12-28 VITALS
HEART RATE: 73 BPM | OXYGEN SATURATION: 99 % | DIASTOLIC BLOOD PRESSURE: 76 MMHG | BODY MASS INDEX: 20.49 KG/M2 | HEIGHT: 57 IN | SYSTOLIC BLOOD PRESSURE: 116 MMHG | WEIGHT: 95 LBS

## 2022-12-28 DIAGNOSIS — G43.109 MIGRAINE WITH AURA AND WITHOUT STATUS MIGRAINOSUS, NOT INTRACTABLE: Primary | ICD-10-CM

## 2022-12-28 PROCEDURE — 99214 OFFICE O/P EST MOD 30 MIN: CPT | Performed by: STUDENT IN AN ORGANIZED HEALTH CARE EDUCATION/TRAINING PROGRAM

## 2022-12-28 RX ORDER — RIZATRIPTAN BENZOATE 10 MG/1
10 TABLET ORAL ONCE AS NEEDED
Qty: 9 TABLET | Refills: 5 | Status: SHIPPED | OUTPATIENT
Start: 2022-12-28 | End: 2023-06-26

## 2022-12-28 RX ORDER — DULOXETIN HYDROCHLORIDE 30 MG/1
30 CAPSULE, DELAYED RELEASE ORAL DAILY
Qty: 30 CAPSULE | Refills: 5 | Status: SHIPPED | OUTPATIENT
Start: 2022-12-28 | End: 2023-02-21 | Stop reason: HOSPADM

## 2022-12-28 RX ORDER — METHYLPREDNISOLONE 4 MG/1
TABLET ORAL
Qty: 21 TABLET | Refills: 0 | Status: SHIPPED | OUTPATIENT
Start: 2022-12-28 | End: 2023-02-21 | Stop reason: HOSPADM

## 2022-12-28 NOTE — LETTER
January 17, 2023     CIERRA Haas  [address]  [place] [id number]    Patient: Lolis Ruiz   YOB: 1990   Date of Visit: 12/28/2022       Dear CIERRA Hubbard:    Thank you for referring Lolis Ruiz to me for evaluation. Below are the relevant portions of my assessment and plan of care.    If you have questions, please do not hesitate to call me. I look forward to following Lolis along with you.         Sincerely,        Tommie Rodriguez MD        CC: No Tommie Curtis MD  01/17/23 0658  Sign when Signing Visit  Chief Complaint   Patient presents with   • Migraine       Patient ID: Lolis Ruiz is a 32 y.o. female.    HPI:    Ms. Ruiz presents today for follow-up of migraine headaches.    Ms. Ruiz reports that her headaches have improved since her last visit. She states that her migraines are not as frequent as they were at her last visit. She notes that she is experiencing more severe migraines than they were at her last visit. She reports that she is experiencing an increase in the number of auras that she is experiencing. She states that she has several auras per day, but she is not having migraines every day. She clarifies that she has an aura that does not necessarily mean that she is getting a migraine. 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 She reports that whenever she gets those auras, she gets a really bad migraine that is very consistent. She states that she saw her eye doctor last week and her eyes are fine. She notes that her headaches are down to 1 migraine per week, but the severity of her migraines is worse than it was at her last visit.     Ms. Ruiz reports that when she gets migraines, she has to take Nurtec and then a couple of hours later she will take ibuprofen in addition. She has been taking the Nurtec every other  day. She notes that she has 1 tablet on standby for when she gets migraines. She reports that her  has had a vasectomy. She states that she has never taken a Medrol Dosepak for her headaches. She notes that she usually takes it for her back whenever it flares up. She reports that the last time she took it for her back was over 1 year ago.     Ms. Ruiz states that she is no longer taking Fioricet. She notes that she has tried Imitrex in the past, but it did not work at all. She has tried riboflavin in the past, topiramate, gabapentin, and possibly amitriptyline, but none of which helped in the past. She has taken metoprolol in the past for cardiac issues. She notes that her primary care doctor put her on metoprolol and then her cardiologist took her off of it. She denies any side effects from the metoprolol. She had an echocardiogram done because she was experiencing syncopal episodes. She states that she saw the cardiologist once and he has not ordered any additional tests at this time. She notes that he is thinking she might have orthostatic hypotension.    Ms. Ruiz reports that she was not getting auras every day when she started Nurtec. She only had 2 auras when she first presented to the clinic and then 6 to 8 weeks ago, she started getting them very frequently. She notes that they are visual. She describes that it is just a white light and then it is gone. She notes that other times she sees spots floating and then the one where she loses her vision is very disorienting and then she usually gets a bad migraine with that one. She reports that this has happened twice. She states that she has tried Ubrelvy in the past, but it did not work for her. She reports that she is very stressed at work right now. She states that her sleep is not great, but she knows the cause of that. She notes that it is too cold to exercise. She denies any new numbness or weakness. She reports that she has a headache right  now.    Ms. Ruiz reports that she is an .     The patient reports that she has a timed water bottle.      The following portions of the patient's history were reviewed and updated as appropriate: allergies, current medications, past family history, past medical history, past social history, past surgical history and problem list.         Review of Systems   Eyes: Positive for photophobia and visual disturbance (more auras, loss of vision in spots). Negative for redness.   Gastrointestinal: Positive for nausea. Negative for diarrhea and vomiting.   Endocrine: Negative for cold intolerance, heat intolerance and polydipsia.   Genitourinary: Negative for decreased urine volume, difficulty urinating and urgency.   Musculoskeletal: Positive for neck pain and neck stiffness. Negative for back pain.   Allergic/Immunologic: Negative for environmental allergies, food allergies and immunocompromised state.   Neurological: Positive for dizziness and headaches. Negative for tremors, seizures, syncope, facial asymmetry, speech difficulty, weakness, light-headedness and numbness.   Hematological: Negative for adenopathy. Does not bruise/bleed easily.   Psychiatric/Behavioral: Positive for sleep disturbance. The patient is nervous/anxious (stress (work related)).            Vitals:    12/28/22 1459   BP: 116/76   Pulse: 73   SpO2: 99%       Neurologic Exam     Mental Status   Attention: normal. Concentration: normal.   Speech: speech is normal   Level of consciousness: alert    Cranial Nerves     CN II   Visual fields full to confrontation.     CN III, IV, VI   Pupils are equal, round, and reactive to light.  Extraocular motions are normal.     CN V   Facial sensation intact.     CN VII   Facial expression full, symmetric.     CN VIII   Hearing: intact    CN IX, X   Palate: symmetric    CN XI   Right trapezius strength: normal  Left trapezius strength: normal    CN XII   Tongue: not atrophic  Fasciculations:  absent  Tongue deviation: none    Motor Exam   Muscle bulk: normalAntigravity in all ext     Sensory Exam   Right arm light touch: normal  Left arm light touch: normal  Right leg light touch: normal  Left leg light touch: normal    Gait, Coordination, and Reflexes     Coordination   Finger to nose coordination: normalNormal unstressed gait       Physical Exam  Eyes:      Extraocular Movements: EOM normal.      Pupils: Pupils are equal, round, and reactive to light.   Neurological:      Coordination: Finger-Nose-Finger Test normal.   Psychiatric:         Speech: Speech normal.         Procedures    Assessment/Plan:       The patient has a chronic condition of migraines which has worsened since the last time with more frequent visual changes/prescription medication management during this visit (MDM Level 4). I think that it would be worthwhile to try an additional prophylaxis medicine. We went over the option of duloxetine and over side effects. Patient is agreeable to take it. In addition, we will trial another new triptan medicine to be taken as needed as well as a 6-day steroid taper to try to help with the headaches that she is having in the light of her not being able to get her prophylactic of Nurtec for at least a few more days. The patient will follow up in 3 to 4 months and we will discuss further interventions as needed at that time.       Diagnoses and all orders for this visit:    1. Migraine with aura and without status migrainosus, not intractable (Primary)    Other orders  -     methylPREDNISolone (MEDROL) 4 MG dose pack; Take as directed on package instructions.  Dispense: 21 tablet; Refill: 0  -     DULoxetine (Cymbalta) 30 MG capsule; Take 1 capsule by mouth Daily.  Dispense: 30 capsule; Refill: 5  -     rizatriptan (Maxalt) 10 MG tablet; Take 1 tablet by mouth 1 (One) Time As Needed for Migraine for up to 180 days. May repeat in 2 hours if needed  Dispense: 9 tablet; Refill: 5          Tommie Rodriguez  MD    Transcribed from ambient dictation for Tommie Rodriguez MD by Basilia Springer.  12/28/22   17:14 EST    Patient or patient representative verbalized consent to the visit recording.  I have personally performed the services described in this document as transcribed by the above individual, and it is both accurate and complete.  Tommie Rodriguez MD  1/17/2023  06:57 EST

## 2022-12-28 NOTE — PROGRESS NOTES
Chief Complaint   Patient presents with   • Migraine       Patient ID: Lolis Ruiz is a 32 y.o. female.    HPI:    Ms. Ruiz presents today for follow-up of migraine headaches.    Ms. Ruiz reports that her headaches have improved since her last visit. She states that her migraines are not as frequent as they were at her last visit. She notes that she is experiencing more severe migraines than they were at her last visit. She reports that she is experiencing an increase in the number of auras that she is experiencing. She states that she has several auras per day, but she is not having migraines every day. She clarifies that she has an aura that does not necessarily mean that she is getting a migraine. 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 She reports that whenever she gets those auras, she gets a really bad migraine that is very consistent. She states that she saw her eye doctor last week and her eyes are fine. She notes that her headaches are down to 1 migraine per week, but the severity of her migraines is worse than it was at her last visit.     Ms. Ruiz reports that when she gets migraines, she has to take Nurtec and then a couple of hours later she will take ibuprofen in addition. She has been taking the Nurtec every other day. She notes that she has 1 tablet on standby for when she gets migraines. She reports that her  has had a vasectomy. She states that she has never taken a Medrol Dosepak for her headaches. She notes that she usually takes it for her back whenever it flares up. She reports that the last time she took it for her back was over 1 year ago.     Ms. Ruiz states that she is no longer taking Fioricet. She notes that she has tried Imitrex in the past, but it did not work at all. She has tried riboflavin in the past, topiramate, gabapentin, and possibly  amitriptyline, but none of which helped in the past. She has taken metoprolol in the past for cardiac issues. She notes that her primary care doctor put her on metoprolol and then her cardiologist took her off of it. She denies any side effects from the metoprolol. She had an echocardiogram done because she was experiencing syncopal episodes. She states that she saw the cardiologist once and he has not ordered any additional tests at this time. She notes that he is thinking she might have orthostatic hypotension.    Ms. Ruiz reports that she was not getting auras every day when she started Nurtec. She only had 2 auras when she first presented to the clinic and then 6 to 8 weeks ago, she started getting them very frequently. She notes that they are visual. She describes that it is just a white light and then it is gone. She notes that other times she sees spots floating and then the one where she loses her vision is very disorienting and then she usually gets a bad migraine with that one. She reports that this has happened twice. She states that she has tried Ubrelvy in the past, but it did not work for her. She reports that she is very stressed at work right now. She states that her sleep is not great, but she knows the cause of that. She notes that it is too cold to exercise. She denies any new numbness or weakness. She reports that she has a headache right now.    Ms. Ruiz reports that she is an .     The patient reports that she has a timed water bottle.      The following portions of the patient's history were reviewed and updated as appropriate: allergies, current medications, past family history, past medical history, past social history, past surgical history and problem list.         Review of Systems   Eyes: Positive for photophobia and visual disturbance (more auras, loss of vision in spots). Negative for redness.   Gastrointestinal: Positive for nausea. Negative for diarrhea and  vomiting.   Endocrine: Negative for cold intolerance, heat intolerance and polydipsia.   Genitourinary: Negative for decreased urine volume, difficulty urinating and urgency.   Musculoskeletal: Positive for neck pain and neck stiffness. Negative for back pain.   Allergic/Immunologic: Negative for environmental allergies, food allergies and immunocompromised state.   Neurological: Positive for dizziness and headaches. Negative for tremors, seizures, syncope, facial asymmetry, speech difficulty, weakness, light-headedness and numbness.   Hematological: Negative for adenopathy. Does not bruise/bleed easily.   Psychiatric/Behavioral: Positive for sleep disturbance. The patient is nervous/anxious (stress (work related)).            Vitals:    12/28/22 1459   BP: 116/76   Pulse: 73   SpO2: 99%       Neurologic Exam     Mental Status   Attention: normal. Concentration: normal.   Speech: speech is normal   Level of consciousness: alert    Cranial Nerves     CN II   Visual fields full to confrontation.     CN III, IV, VI   Pupils are equal, round, and reactive to light.  Extraocular motions are normal.     CN V   Facial sensation intact.     CN VII   Facial expression full, symmetric.     CN VIII   Hearing: intact    CN IX, X   Palate: symmetric    CN XI   Right trapezius strength: normal  Left trapezius strength: normal    CN XII   Tongue: not atrophic  Fasciculations: absent  Tongue deviation: none    Motor Exam   Muscle bulk: normalAntigravity in all ext     Sensory Exam   Right arm light touch: normal  Left arm light touch: normal  Right leg light touch: normal  Left leg light touch: normal    Gait, Coordination, and Reflexes     Coordination   Finger to nose coordination: normalNormal unstressed gait       Physical Exam  Eyes:      Extraocular Movements: EOM normal.      Pupils: Pupils are equal, round, and reactive to light.   Neurological:      Coordination: Finger-Nose-Finger Test normal.   Psychiatric:          Speech: Speech normal.         Procedures    Assessment/Plan:        The patient has a chronic condition of migraines which has worsened since the last time with more frequent visual changes/prescription medication management during this visit (MDM Level 4). I think that it would be worthwhile to try an additional prophylaxis medicine. We went over the option of duloxetine and over side effects. Patient is agreeable to take it. In addition, we will trial another new triptan medicine to be taken as needed as well as a 6-day steroid taper to try to help with the headaches that she is having in the light of her not being able to get her prophylactic of Nurtec for at least a few more days. The patient will follow up in 3 to 4 months and we will discuss further interventions as needed at that time.       Diagnoses and all orders for this visit:    1. Migraine with aura and without status migrainosus, not intractable (Primary)    Other orders  -     methylPREDNISolone (MEDROL) 4 MG dose pack; Take as directed on package instructions.  Dispense: 21 tablet; Refill: 0  -     DULoxetine (Cymbalta) 30 MG capsule; Take 1 capsule by mouth Daily.  Dispense: 30 capsule; Refill: 5  -     rizatriptan (Maxalt) 10 MG tablet; Take 1 tablet by mouth 1 (One) Time As Needed for Migraine for up to 180 days. May repeat in 2 hours if needed  Dispense: 9 tablet; Refill: 5           Tommie Rodriguez MD    Transcribed from ambient dictation for Tommie Rodriguez MD by Basilia Springer.  12/28/22   17:14 EST    Patient or patient representative verbalized consent to the visit recording.  I have personally performed the services described in this document as transcribed by the above individual, and it is both accurate and complete.  Tommie Rodriguez MD  1/17/2023  06:57 EST

## 2023-01-27 DIAGNOSIS — R42 EPISODIC LIGHTHEADEDNESS: ICD-10-CM

## 2023-01-27 DIAGNOSIS — R51.9 WORSENING HEADACHES: Primary | ICD-10-CM

## 2023-02-06 ENCOUNTER — HOSPITAL ENCOUNTER (OUTPATIENT)
Dept: MRI IMAGING | Facility: HOSPITAL | Age: 33
Discharge: HOME | End: 2023-02-06
Admitting: NEUROLOGICAL SURGERY
Payer: COMMERCIAL

## 2023-02-06 DIAGNOSIS — G93.5 CHIARI I MALFORMATION: ICD-10-CM

## 2023-02-06 PROCEDURE — 72141 MRI NECK SPINE W/O DYE: CPT

## 2023-02-17 NOTE — PROGRESS NOTES
Subjective   History of Present Illness: Lolis Ruiz is a 32 y.o. female is here today for follow-up on chiari malformation. MRI Cervical completed on 23. She has complaints of severe headaches over the last four days described as constant and throbbing associated with nausea. She also has complaints of recent syncopal episodes in which was workup by cardiology and etiology not found. She follows with neurology for migraines and has an upcoming MRI scheduled and follow up with them. She reports worsening auras with visual disturbances and states they are becoming more frequent. She gets some relief from rizatriptan but states can only take 9 times a month. She has not had to present to the ED for recent headaches.    The following portions of the patient's history were reviewed and updated as appropriate: allergies, current medications, past family history, past medical history, past social history, past surgical history and problem list.    Past Medical History:   Diagnosis Date   • Asthma    • Catscratch disease    • Kidney stone    • Liver lesion    • Migraine    • Spondylolysis         Past Surgical History:   Procedure Laterality Date   •  SECTION     • WISDOM TOOTH EXTRACTION            Current Outpatient Medications:   •  Ibuprofen 200 MG capsule, Take  by mouth., Disp: , Rfl:   •  Rimegepant Sulfate (NURTEC) 75 MG tablet dispersible tablet, Take 1 tablet by mouth Every Other Day., Disp: 15 tablet, Rfl: 11  •  rizatriptan (Maxalt) 10 MG tablet, Take 1 tablet by mouth 1 (One) Time As Needed for Migraine for up to 180 days. May repeat in 2 hours if needed, Disp: 9 tablet, Rfl: 5  •  methylPREDNISolone (MEDROL) 4 MG dose pack, Take as directed on package instructions., Disp: 21 tablet, Rfl: 0     Allergies   Allergen Reactions   • Doxycycline Other (See Comments)     Hands went numb   • Keflex [Cephalexin] Other (See Comments)     Hands went numb   • Adhesive Tape Irritability        Social  "History     Socioeconomic History   • Marital status:    Tobacco Use   • Smoking status: Never   • Smokeless tobacco: Never   Vaping Use   • Vaping Use: Never used   Substance and Sexual Activity   • Alcohol use: Never   • Drug use: Never   • Sexual activity: Never     Partners: Male     Birth control/protection: None        Family History   Problem Relation Age of Onset   • Hypertension Mother    • No Known Problems Sister    • No Known Problems Brother    • No Known Problems Maternal Aunt    • No Known Problems Paternal Aunt    • No Known Problems Maternal Grandmother    • No Known Problems Paternal Grandmother    • No Known Problems Daughter    • No Known Problems Son         Review of Systems   Constitutional: Positive for activity change.   Gastrointestinal: Positive for nausea. Negative for vomiting.   Neurological: Positive for dizziness, syncope, light-headedness, numbness and headaches. Negative for speech difficulty.   Psychiatric/Behavioral: The patient is nervous/anxious.        Objective     Vitals:    02/21/23 1153   BP: 118/80   Pulse: 117   Temp: 97 °F (36.1 °C)   SpO2: 99%   Weight: 44.1 kg (97 lb 3.2 oz)   Height: 144.8 cm (57\")     Body mass index is 21.03 kg/m².      Physical Exam  Vitals and nursing note reviewed.   Constitutional:       Comments: mildly distressed   HENT:      Head: Normocephalic and atraumatic.   Eyes:      Extraocular Movements: Extraocular movements intact.      Pupils: Pupils are equal, round, and reactive to light.   Pulmonary:      Effort: Pulmonary effort is normal. No respiratory distress.   Musculoskeletal:         General: Normal range of motion.   Neurological:      General: No focal deficit present.      Mental Status: She is alert and oriented to person, place, and time.      Cranial Nerves: Cranial nerves 2-12 are intact. No cranial nerve deficit.      Sensory: No sensory deficit.      Motor: Motor strength is normal. No weakness.      Gait: Gait is intact. "   Psychiatric:         Mood and Affect: Affect is tearful.         Speech: Speech normal.         Behavior: Behavior normal.         Cognition and Memory: Memory normal.       Neurologic Exam     Mental Status   Oriented to person, place, and time.   Speech: speech is normal     Cranial Nerves   Cranial nerves II through XII intact.     CN III, IV, VI   Pupils are equal, round, and reactive to light.    Motor Exam   Muscle bulk: normal  Overall muscle tone: normal    Strength   Strength 5/5 throughout.     Sensory Exam   Light touch normal.     Gait, Coordination, and Reflexes     Gait  Gait: normal          Assessment & Plan   Independent Review of Radiographic Studies:      I personally reviewed the images from the following studies.    MRI EXAMINATION CERVICAL SPINE WITHOUT CONTRAST     HISTORY: Evaluate for Chiari malformation/syrinx.     COMPARISON: MRI cervical spine 01/31/2022 and 11/23/2021.     FINDINGS: The alignment of the cervical spine is within normal limits.  There is no evidence of a Chiari I malformation or of a syrinx. There is  no evidence of disc bulge, herniation, spinal stenosis or foraminal  narrowing.     IMPRESSION:  There is no evidence of a syrinx or of a Chiari I  malformation.      Medical Decision Making:      This is a 32-year-old female with type I Chiari malformation.    · A follow-up MRI of her cervical spine shows no evidence of a syrinx or of Chiari I malformation.  Tonsils do not appear Peg shaped.  Recommend repeating MRI of the cervical and thoracic spine in 1 year.  · Patient's main problems seem to be related to her migraines.  We have reached out to neurology but unfortunately they will not be able to see her today.  They will attempt to move up her appointment and have called in steroids for her.  Defer any further treatment to them. she has been advised to go to the ED if headache does not improve or if worsens.    Diagnoses and all orders for this visit:    1. Chiari I  malformation (HCC) (Primary)  -     MRI Cervical Spine Without Contrast; Future  -     MRI Thoracic Spine Without Contrast; Future      Return in about 1 year (around 2/21/2024) for After imaging.

## 2023-02-21 ENCOUNTER — DOCUMENTATION (OUTPATIENT)
Dept: NEUROLOGY | Facility: CLINIC | Age: 33
End: 2023-02-21
Payer: COMMERCIAL

## 2023-02-21 ENCOUNTER — OFFICE VISIT (OUTPATIENT)
Dept: NEUROSURGERY | Facility: CLINIC | Age: 33
End: 2023-02-21
Payer: COMMERCIAL

## 2023-02-21 ENCOUNTER — TELEPHONE (OUTPATIENT)
Dept: NEUROSURGERY | Facility: CLINIC | Age: 33
End: 2023-02-21
Payer: COMMERCIAL

## 2023-02-21 ENCOUNTER — TELEPHONE (OUTPATIENT)
Dept: NEUROLOGY | Facility: CLINIC | Age: 33
End: 2023-02-21

## 2023-02-21 VITALS
BODY MASS INDEX: 20.97 KG/M2 | HEART RATE: 117 BPM | OXYGEN SATURATION: 99 % | DIASTOLIC BLOOD PRESSURE: 80 MMHG | WEIGHT: 97.2 LBS | TEMPERATURE: 97 F | SYSTOLIC BLOOD PRESSURE: 118 MMHG | HEIGHT: 57 IN

## 2023-02-21 DIAGNOSIS — G93.5 CHIARI I MALFORMATION: Primary | ICD-10-CM

## 2023-02-21 PROCEDURE — 99214 OFFICE O/P EST MOD 30 MIN: CPT | Performed by: NURSE PRACTITIONER

## 2023-02-21 RX ORDER — METHYLPREDNISOLONE 4 MG/1
TABLET ORAL
Qty: 21 TABLET | Refills: 0 | Status: SHIPPED | OUTPATIENT
Start: 2023-02-21 | End: 2023-02-28

## 2023-02-21 NOTE — TELEPHONE ENCOUNTER
Provider: SANTOS  Caller: IRMA  Relationship to Patient: SELF  Pharmacy: N/A  Phone Number: [phone]  Reason for Call: PT CALLED AND STATES THAT SHE HAS FOUND A PLACE SOONER THAT SHE CAN HAVE A MRI PREFORMED. PT WANTS TO KNOW IF THE ORDERS CAN BE FAXED.  FAX# [phone]    PLEASE REVIEW AND ADVISE.  THANK YOU.

## 2023-02-21 NOTE — TELEPHONE ENCOUNTER
Patient was seen in the office today with Rocío Granados. She was very tearful with complaints of severe headaches for four days straight and photophobia( she wore sunglasses) and neausea. She was advised to go to the ER but did not want to go. I contacted Neurology departmement however, they was unable to see her in office today. Last message per Dr. Rodriguez the could possibly try Occipital nevere blocks. She said due to costs she can not do them at this time. Neurology sent in a medrol dose pack and will reach out to her to move her appointment up sooner.     We again advised her to go to the ER for management of her headaches.

## 2023-02-21 NOTE — PROGRESS NOTES
My office was contacted by patient who notes having a severe headache recently.  I will reorder Medrol Dosepak.  She last had 1 around winter 2022.  She has an MRI scheduled for March 1 and my office will offer her an earlier appointment to see her on March 17 to discuss additional prophylaxis and help with any insurance coverage issues.

## 2023-02-28 ENCOUNTER — OFFICE VISIT (OUTPATIENT)
Dept: INTERNAL MEDICINE | Facility: CLINIC | Age: 33
End: 2023-02-28
Payer: COMMERCIAL

## 2023-02-28 VITALS
OXYGEN SATURATION: 98 % | HEART RATE: 94 BPM | SYSTOLIC BLOOD PRESSURE: 110 MMHG | WEIGHT: 93 LBS | DIASTOLIC BLOOD PRESSURE: 80 MMHG | BODY MASS INDEX: 20.13 KG/M2 | TEMPERATURE: 98 F

## 2023-02-28 DIAGNOSIS — G93.5 CHIARI I MALFORMATION: ICD-10-CM

## 2023-02-28 DIAGNOSIS — G43.819 OTHER MIGRAINE WITHOUT STATUS MIGRAINOSUS, INTRACTABLE: ICD-10-CM

## 2023-02-28 DIAGNOSIS — T14.90XA TRAUMA IN CHILDHOOD: ICD-10-CM

## 2023-02-28 DIAGNOSIS — R55 VASOVAGAL NEAR SYNCOPE: ICD-10-CM

## 2023-02-28 DIAGNOSIS — Z76.89 ENCOUNTER TO ESTABLISH CARE: Primary | ICD-10-CM

## 2023-02-28 PROBLEM — Z62.819 TRAUMA IN CHILDHOOD: Status: ACTIVE | Noted: 2023-02-28

## 2023-02-28 PROBLEM — G43.909 MIGRAINE: Status: ACTIVE | Noted: 2021-11-18

## 2023-02-28 PROCEDURE — 99214 OFFICE O/P EST MOD 30 MIN: CPT | Performed by: NURSE PRACTITIONER

## 2023-02-28 RX ORDER — ACETAMINOPHEN 500 MG
500 TABLET ORAL EVERY 6 HOURS PRN
COMMUNITY
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
## 2023-02-28 NOTE — ASSESSMENT & PLAN NOTE
Continues on nurtec.   Maxalt PRN.   Is trying to pull off taking ibuprofen as much as she had been due to trying to stay away from rebound headaches.   Remains uncontrolled.   Following with neurology  [employer] paperwork completed during visit today due to patient's continued symptoms and ongoing tx.
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
## 2023-03-09 ENCOUNTER — OFFICE VISIT (OUTPATIENT)
Dept: NEUROLOGY | Facility: CLINIC | Age: 33
End: 2023-03-09
Payer: COMMERCIAL

## 2023-03-09 VITALS
WEIGHT: 92 LBS | DIASTOLIC BLOOD PRESSURE: 78 MMHG | BODY MASS INDEX: 19.85 KG/M2 | OXYGEN SATURATION: 99 % | HEIGHT: 57 IN | HEART RATE: 96 BPM | SYSTOLIC BLOOD PRESSURE: 102 MMHG

## 2023-03-09 DIAGNOSIS — M54.81 BILATERAL OCCIPITAL NEURALGIA: ICD-10-CM

## 2023-03-09 DIAGNOSIS — G43.109 MIGRAINE WITH AURA AND WITHOUT STATUS MIGRAINOSUS, NOT INTRACTABLE: ICD-10-CM

## 2023-03-09 DIAGNOSIS — R51.9 WORSENING HEADACHES: Primary | ICD-10-CM

## 2023-03-09 PROBLEM — I51.7 CARDIOMEGALY: Status: ACTIVE | Noted: 2023-03-09

## 2023-03-09 PROBLEM — I49.1 PREMATURE ATRIAL CONTRACTION: Status: ACTIVE | Noted: 2021-04-01

## 2023-03-09 PROBLEM — G93.5 CHIARI MALFORMATION TYPE I (HCC): Status: ACTIVE | Noted: 2021-12-03

## 2023-03-09 PROBLEM — R16.0 LIVER MASS: Status: ACTIVE | Noted: 2022-02-17

## 2023-03-09 PROBLEM — R00.2 PALPITATIONS: Status: ACTIVE | Noted: 2021-02-24

## 2023-03-09 PROBLEM — R07.89 CHEST DISCOMFORT: Status: ACTIVE | Noted: 2021-02-24

## 2023-03-09 PROCEDURE — 99214 OFFICE O/P EST MOD 30 MIN: CPT | Performed by: STUDENT IN AN ORGANIZED HEALTH CARE EDUCATION/TRAINING PROGRAM

## 2023-03-09 NOTE — PROGRESS NOTES
"Chief Complaint   Patient presents with   • Migraine     20/30 migraine days, lasts about 24 H    • Syncope       Patient ID: Lolis Ruiz is a 32 y.o. female.    HPI:    Lolis Ruiz is a 32-year-old female who presents today for follow-up of migraine headaches.    The patient reports that she has not been doing well since her last visit. She states that her headaches are the worst they have ever had. She reports that she is having headaches every day. She states that she tried 2 steroid packs in 12/2022 as well as more recently with no effect. She confirms that she is still taking Nurtec every day. When she runs out of the Nurtec, she will take the rizatriptan. She notes that the rizatriptan and ibuprofen help provide relief sometimes. She states that it occasionally helps alleviate the pain but not the other symptoms. She left her job early on 03/04/2023 and she had 5 bad headaches. She reports that she passed out twice once she got home. She is unsure if it was from the headaches or from being fatigued. She reports that she always feels dizzy, faint, and that she stumbles a lot. She reports that if it is a severe migraine, she can feel the aura. She states that it feels like a \"rubber band will snap\" and that she will lose her vision. She knows that when that happens, it is going to be a really bad migraine. She reports that otherwise she will have a black spot that \"ping pongs\" across bilateral eyes. Some days it will happen in one eye more than the other. The majority of the time she will experience the \"starburst\" patterns happen when she is feeling faint or the feeling of passing out. She reports that she had someone brought up POTS syndrome and that she is not sure if this could be related to her symptoms.  She got some additional information about the occipital nerve blocks and is ready to move forward with nerve blocks to see if they relieve her headaches.  Her migraines have a component of occipital " "neuralgia.  She does not clearly have all symptoms of basilar migraine    She states that she has tried metoprolol. She states that she was concerned about orthostatic hypotension or tachycardia in the past.    She states that she used to get herself shots every night when she was a child. She reports that it was growth hormone because her growth hormone levels were low. She states that it did not help. She states that she did it for 1.5 years. She states that her injections did not bother her.    She denies having hypertension, hyperlipidemia, or diabetes. She denies having vertigo. She denies having any falling. She states that she does stumble a lot. She states that she has spondylolysis. She states that she gets some effects in her lower extremities. She denies having neuropathy on her hands. She denies having slurred speech. She notes that at times she has struggled to find words, however, she attributes that more like \"brain fog.\"  She denies having paralysis on one side of her body.    She states that when she saw her primary care provider, she had mentioned somewhere in her chart it mentioned epilepsy or seizures. She notes that at one point in time they were doing the study. She states that she wanted to make sure that she did not misunderstand something.  From further chart review after the visit it looks like the [facility] mentioned epilepsy.  I discussed with the patient that I do not think that she has epilepsy.    She states that her job can be a source of stress.      She states that she lives at home with her  and kids.     The following portions of the patient's history were reviewed and updated as appropriate: allergies, current medications, past family history, past medical history, past social history, past surgical history and problem list.      Review of Systems   Neurological: Positive for dizziness, tremors, syncope, light-headedness and headaches. Negative for seizures, speech " difficulty, weakness and numbness.   Hematological: Does not bruise/bleed easily.   Psychiatric/Behavioral: Positive for behavioral problems (emotional) and confusion (brain fog ). Negative for agitation, decreased concentration, hallucinations, self-injury, sleep disturbance and suicidal ideas. The patient is nervous/anxious.       Vitals:    03/09/23 1131   BP: 102/78   Pulse: 96   SpO2: 99%       Neurologic Exam     Mental Status   Attention: normal. Concentration: normal.   Speech: speech is normal   Level of consciousness: alert    Cranial Nerves     CN II   Visual fields full to confrontation.     CN III, IV, VI   Pupils are equal, round, and reactive to light.  Extraocular motions are normal.     CN V   Facial sensation intact.     CN VII   Facial expression full, symmetric.     CN VIII   Hearing: intact    CN IX, X   Palate: symmetric    CN XI   Right trapezius strength: normal  Left trapezius strength: normal    CN XII   Tongue: not atrophic  Fasciculations: absent  Tongue deviation: none    Motor Exam   Muscle bulk: normalAntigravity in all ext     Sensory Exam   Right arm light touch: normal  Left arm light touch: normal  Right leg light touch: normal  Left leg light touch: normal    Gait, Coordination, and Reflexes     Coordination   Finger to nose coordination: normalNormal unstressed gait       Physical Exam  Eyes:      Extraocular Movements: EOM normal.      Pupils: Pupils are equal, round, and reactive to light.   Neurological:      Coordination: Finger-Nose-Finger Test normal.   Psychiatric:         Speech: Speech normal.         Procedures    Assessment/Plan:    The patient has had worsening migraines.  She has tried numerous abortive as well as preventative agents and unfortunately she continues to have significant headache burden.  We discussed some of the clinical data of atogepant and she expresses interest in trying this.  Since her headaches also have an aspect of occipital neuralgia I will  give her a greater occipital nerve block on March 10.     There are no diagnoses linked to this encounter.     1. Migraine headaches  - The patient was provided with samples of atogepant. Side effects were discussed.  A sample of Trudhesa was provided and discussed risk of side effects including heart attack.  - She was advised to avoid physical activity.  - She will follow up in 3 months.    In the past she has taken topiramate, gabapentin, and duloxetine which are in the preventative classes of migraine medicines, and in terms of abortives she has tried Imitrex, Maxalt with incomplete effectiveness.  Duloxetine was discontinued due to intolerable side effects in addition to ineffectiveness.  She also takes Nurtec every other day with 1 pill as needed for migraines. Discussed in the past [facility] had denied her Nurtec and one of the reasons listed for denial was administration of a concurrent CGRP inhibitor.  I can give her samples of atogepant but she may not be able to receive both of these medicines simultaneously even though Nurtec can be used concurrently as an abortive. If her atogepant is approved and [facility] requires me to discontinue Nurtec I would be happy to do so, as I plan to transition her from Nurtec to atogepant.    2.  Occipital neuralgia  -Trial of greater occipital nerve block    I spent 30 minutes caring for this patient on this date of service. This time includes time spent by me in the following activities as necessary: preparing for the visit, reviewing tests, medical records and previous visits, obtaining and/or reviewing a separately obtained history, performing a medically appropriate exam and/or evaluation, counseling and educating the patient, and/or communicating with other healthcare professionals, documenting information in the medical record, independently interpreting results and communicating that information with the patient, and developing a medically appropriate treatment plan with  consideration of other conditions, medications, and treatments.    Transcribed from ambient dictation for Tommie Rodriguez MD by Shannan Park.  03/09/23   13:56 EST    Patient or patient representative verbalized consent to the visit recording.  I have personally performed the services described in this document as transcribed by the above individual, and it is both accurate and complete.  Tommie Rodriguez MD  3/13/2023  17:20 EDT

## 2023-03-09 NOTE — PATIENT INSTRUCTIONS
What are the possible side effects of Trudhesa?    Trudhesa may cause serious side effects including:    hives,  difficulty breathing,  swelling of your face, lips, tongue, or throat,  slow or fast heartbeat,  weakness in the legs,  muscle pain in the arms or legs,  tingling or numbness,  coldness in the hands and feet,  blue fingers and toes,  severe stomach pain,  jaw or left arm pain,  chest pain or pressure,  sudden tiredness,  weakness on one side of the body,  trouble speaking,  vision changes,  mental or mood changes,  confusion,  rash,  itching, and  severe dizziness  Get medical help right away, if you have any of the symptoms listed above.    The most common side effects of Trudhesa include:    sore nose or throat,  stuffy or runny nose,  changes in taste,  dizziness, and  nausea    Tell the doctor if you have any side effect that bothers you or that does not go away.    These are not all the possible side effects of Trudhesa.

## 2023-03-10 ENCOUNTER — PROCEDURE VISIT (OUTPATIENT)
Dept: NEUROLOGY | Facility: CLINIC | Age: 33
End: 2023-03-10
Payer: COMMERCIAL

## 2023-03-10 DIAGNOSIS — R51.9 OCCIPITAL HEADACHE: Primary | ICD-10-CM

## 2023-03-10 DIAGNOSIS — G43.719 INTRACTABLE CHRONIC MIGRAINE WITHOUT AURA AND WITHOUT STATUS MIGRAINOSUS: ICD-10-CM

## 2023-03-10 PROCEDURE — 64405 NJX AA&/STRD GR OCPL NRV: CPT | Performed by: STUDENT IN AN ORGANIZED HEALTH CARE EDUCATION/TRAINING PROGRAM

## 2023-03-10 RX ORDER — METHYLPREDNISOLONE ACETATE 40 MG/ML
40 INJECTION, SUSPENSION INTRA-ARTICULAR; INTRALESIONAL; INTRAMUSCULAR; SOFT TISSUE ONCE
Status: COMPLETED | OUTPATIENT
Start: 2023-03-10 | End: 2023-03-10

## 2023-03-10 RX ORDER — LIDOCAINE HYDROCHLORIDE 10 MG/ML
9 INJECTION, SOLUTION EPIDURAL; INFILTRATION; INTRACAUDAL; PERINEURAL ONCE
Status: COMPLETED | OUTPATIENT
Start: 2023-03-10 | End: 2023-03-10

## 2023-03-10 RX ADMIN — METHYLPREDNISOLONE ACETATE 40 MG: 40 INJECTION, SUSPENSION INTRA-ARTICULAR; INTRALESIONAL; INTRAMUSCULAR; SOFT TISSUE at 11:09

## 2023-03-10 RX ADMIN — LIDOCAINE HYDROCHLORIDE 9 ML: 10 INJECTION, SOLUTION EPIDURAL; INFILTRATION; INTRACAUDAL; PERINEURAL at 11:09

## 2023-03-13 ENCOUNTER — TELEPHONE (OUTPATIENT)
Dept: NEUROLOGY | Facility: CLINIC | Age: 33
End: 2023-03-13
Payer: COMMERCIAL

## 2023-03-13 ENCOUNTER — OFFICE VISIT (OUTPATIENT)
Dept: OBSTETRICS AND GYNECOLOGY | Age: 33
End: 2023-03-13
Payer: COMMERCIAL

## 2023-03-13 VITALS
WEIGHT: 91 LBS | SYSTOLIC BLOOD PRESSURE: 110 MMHG | DIASTOLIC BLOOD PRESSURE: 78 MMHG | BODY MASS INDEX: 19.63 KG/M2 | HEIGHT: 57 IN

## 2023-03-13 DIAGNOSIS — Z01.419 ENCOUNTER FOR GYNECOLOGICAL EXAMINATION WITHOUT ABNORMAL FINDING: Primary | ICD-10-CM

## 2023-03-13 PROCEDURE — 99395 PREV VISIT EST AGE 18-39: CPT | Performed by: OBSTETRICS & GYNECOLOGY

## 2023-03-13 RX ORDER — DIHYDROERGOTAMINE MESYLATE 4 MG/ML
SPRAY, METERED NASAL
COMMUNITY

## 2023-03-13 NOTE — TELEPHONE ENCOUNTER
Caller: [facility] ([facility] ) - [place], [place] - [address] [facility] [phone] [facility] [phone]     Relationship: Pharmacy.    Best call back number: [phone]    Who are you requesting to speak with (clinical staff, provider,  specific staff member):   DR GRAHAM    Do you know the name of the person who called:NATALY    What was the call regarding:   JI MCCLURE  REF NO: [id number]  CALLING TO CONFIRM [facility] THE PT HAS TRIED.    Do you require a callback:  YES, PLEASE.

## 2023-03-13 NOTE — PROGRESS NOTES
Routine Annual Visit    3/13/2023    Patient: Lolis Ruiz          MR#:[id number]      Chief Complaint   Patient presents with   • Gynecologic Exam     Annual:Last pap        History of Present Illness    32 y.o. female  who presents for annual exam.     Pt with regular menses  Pap is UTD  Stopped ocps   got VAS    Will have to quit or change her job due to headaches            Patient's last menstrual period was 2023 (exact date).  Obstetric History:  OB History        2    Para   2    Term   2            AB        Living   2       SAB        IAB        Ectopic        Molar        Multiple        Live Births   2               Menstrual History:     Patient's last menstrual period was 2023 (exact date).       Sexual History:       ________________________________________  Patient Active Problem List   Diagnosis   • Migraine   • Vasovagal near syncope   • Chiari I malformation (HCC)   • Trauma in childhood   • Anemia   • Asthma   • Cardiomegaly   • Chest discomfort   • Daytime somnolence   • Liver mass   • Palpitations   • Premature atrial contraction   • Chiari malformation type I (HCC)       Past Medical History:   Diagnosis Date   • Asthma    • Catscratch disease    • History of medical problems     Chiari 1 malformation.  Hemangiomas on liver.  SVT'S, PVC'S, PAC'S, mild regurgitation at mitral and tricuspid valves   • Kidney stone    • Liver lesion    • Low back pain     Spondylolysis L4   • Migraine    • Spondylolysis        Past Surgical History:   Procedure Laterality Date   •  SECTION     • EYE SURGERY  2019    Lasik   • WISDOM TOOTH EXTRACTION         Social History     Tobacco Use   Smoking Status Never   Smokeless Tobacco Never       has a current medication list which includes the following prescription(s): acetaminophen, atogepant, trudhesa, ibuprofen, rimegepant sulfate, and rizatriptan.  ________________________________________    Current  "contraception: vasectomy  History of abnormal Pap smear: no  Family history of Breast cancer:         The following portions of the patient's history were reviewed and updated as appropriate: allergies, current medications, past family history, past medical history, past social history, past surgical history and problem list.    Review of Systems    Pertinent items are noted in HPI.     Objective   Physical Exam    /78   Ht 144.8 cm (57\")   Wt 41.3 kg (91 lb)   LMP 02/21/2023 (Exact Date)   BMI 19.69 kg/m²    BP Readings from Last 3 Encounters:   03/13/23 110/78   03/09/23 102/78   02/28/23 110/80      Wt Readings from Last 3 Encounters:   03/13/23 41.3 kg (91 lb)   03/09/23 41.7 kg (92 lb)   02/28/23 42.2 kg (93 lb)      BMI: Estimated body mass index is 19.69 kg/m² as calculated from the following:    Height as of this encounter: 144.8 cm (57\").    Weight as of this encounter: 41.3 kg (91 lb).      General:   alert, appears stated age and cooperative   Abdomen: soft, non-tender, without masses or organomegaly   Breast: inspection negative, no nipple discharge or bleeding, no masses or nodularity palpable   Vulva: normal   Vagina: normal mucosa   Cervix: no cervical motion tenderness and no lesions   Uterus: normal size, mobile and non-tender   Adnexa: no mass, fullness, tenderness     Assessment:    1. Normal annual exam   Assessment     ICD-10-CM ICD-9-CM   1. Encounter for gynecological examination without abnormal finding  Z01.419 V72.31     Plan:    Plan       []  PAP done  []  Labs:   []  GC/Chl/TV          Diagnoses and all orders for this visit:    1. Encounter for gynecological examination without abnormal finding (Primary)            Counseling:  --Nutrition: Stressed importance of moderation and caloric balance, stressed fresh fruit and vegetables  --Exercise: Stressed the importance of regular exercise. 3-5 times weekly       --Discussed pap smear screening recommendations    "

## 2023-03-14 NOTE — TELEPHONE ENCOUNTER
PA DENIED-  AS PER [facility] THIS MEDICATION IS NOT INDICATED FOR HER MEDICAL CONDITION    DENIAL SCANNED INTO HER CHART UNDER MEDIA    DO YOU WANT TO WRITE A LETTER OF APPEAL ?

## 2023-03-21 ENCOUNTER — OFFICE VISIT (OUTPATIENT)
Dept: INTERNAL MEDICINE | Facility: CLINIC | Age: 33
End: 2023-03-21
Payer: COMMERCIAL

## 2023-03-21 VITALS
OXYGEN SATURATION: 99 % | SYSTOLIC BLOOD PRESSURE: 118 MMHG | HEART RATE: 79 BPM | DIASTOLIC BLOOD PRESSURE: 80 MMHG | HEIGHT: 57 IN | TEMPERATURE: 97.5 F | WEIGHT: 93 LBS | BODY MASS INDEX: 20.06 KG/M2

## 2023-03-21 DIAGNOSIS — G43.819 OTHER MIGRAINE WITHOUT STATUS MIGRAINOSUS, INTRACTABLE: Primary | ICD-10-CM

## 2023-03-21 NOTE — ASSESSMENT & PLAN NOTE
Headaches are unchanged.  Continue current treatment regimen.  Recommended further f/u with Dr. Rodriguez;  Continue with medication tx.   Recommended next 6 weeks off due to increased stressors from work and concern for worsening current migraines.   Will reassess at 6 weeks.

## 2023-03-21 NOTE — PROGRESS NOTES
"Chief Complaint  Occipital nerve blocks     Subjective        Lolis Ruiz presents to [facility]  History of Present Illness  This is a 31 y/o female presenting to office for f/u with ongoing migraines. Patient is following with neurology. Patient reports that she is still struggling with almost daily complicated migraines. Was recently tx with occipital nerve block; patient reports she has been under a lot of stress from work which is worsening the migraines that she is experiencing. Patient is currently using qulipta as well as nurtec. Patient is currently waiting on insurance approval for the continuation of qulipta. Patient is present with [facility] paperwork to see if we can get improvement with the migraines with some time off of her stressful situation.       Objective   Vital Signs:  /80 (BP Location: Left arm, Patient Position: Sitting, Cuff Size: Adult)   Pulse 79   Temp 97.5 °F (36.4 °C) (Infrared)   Ht 144.8 cm (57\")   Wt 42.2 kg (93 lb)   SpO2 99%   BMI 20.13 kg/m²   Estimated body mass index is 20.13 kg/m² as calculated from the following:    Height as of this encounter: 144.8 cm (57\").    Weight as of this encounter: 42.2 kg (93 lb).       BMI is within normal parameters. No other follow-up for BMI required.      Physical Exam  Constitutional:       Appearance: Normal appearance.   HENT:      Head: Normocephalic and atraumatic.      Right Ear: External ear normal.      Left Ear: External ear normal.   Eyes:      Conjunctiva/sclera: Conjunctivae normal.      Pupils: Pupils are equal, round, and reactive to light.   Cardiovascular:      Rate and Rhythm: Normal rate and regular rhythm.      Pulses: Normal pulses.      Heart sounds: Normal heart sounds. No murmur heard.    No friction rub. No gallop.   Pulmonary:      Effort: Pulmonary effort is normal. No respiratory distress.      Breath sounds: Normal breath sounds. No stridor. No wheezing, rhonchi or rales. "   Musculoskeletal:      Cervical back: Normal range of motion and neck supple.   Skin:     General: Skin is warm and dry.   Neurological:      General: No focal deficit present.      Mental Status: She is alert and oriented to person, place, and time. Mental status is at baseline.   Psychiatric:         Mood and Affect: Mood normal.         Thought Content: Thought content normal.         Judgment: Judgment normal.        Result Review :  The following data was reviewed by: CIERRA Haas on 03/21/2023:    Office Visit with Tommie Rodriguez MD (03/09/2023)               Assessment and Plan   Diagnoses and all orders for this visit:    1. Other migraine without status migrainosus, intractable (Primary)  Assessment & Plan:  Headaches are unchanged.  Continue current treatment regimen.  Recommended further f/u with Dr. Rodriguez;  Continue with medication tx.   Recommended next 6 weeks off due to increased stressors from work and concern for worsening current migraines.   Will reassess at 6 weeks.       FMLA/STD paperwork filled out during visit today.      I spent 30 minutes caring for Lolis on this date of service. This time includes time spent by me in the following activities:preparing for the visit, reviewing tests, obtaining and/or reviewing a separately obtained history, performing a medically appropriate examination and/or evaluation , counseling and educating the patient/family/caregiver, documenting information in the medical record and care coordination  Follow Up   Return for Next scheduled follow up 6/28/23.  Patient was given instructions and counseling regarding her condition or for health maintenance advice. Please see specific information pulled into the AVS if appropriate.

## 2023-03-23 NOTE — PROGRESS NOTES
Indication for Procedure: Occipital Neuralgia  Procedure:  Bilateral Greater Occipital Nerve Block    Risks and explained to the patient including the risk of bleeding, local infection, and local pain. Patient expressed understanding and informed consent was obtained.  Interval history: Patient continues to have migraines with occipital neuragia component. First time getting nerve block.  Time out performed.    Bilateral occipital nerve roots and landmarks identified utilizing palpation techniques.      2 injections of 5mL of of the following medications were administered utilizing a 2 5 mL syringe each with a 25 gauge needle into an area surrounding the nerve root.  The mixture contained:  9 ml of 1% lidocaine without preservative  1 ml.of 40 mg/mL methylprednisolone.    The patient tolerated the procedure well with no complications and no blood loss.    The patient is to follow up for repeat injection in approximately 3 months

## 2023-04-13 ENCOUNTER — TELEPHONE (OUTPATIENT)
Dept: NEUROLOGY | Facility: CLINIC | Age: 33
End: 2023-04-13
Payer: COMMERCIAL

## 2023-04-13 NOTE — TELEPHONE ENCOUNTER
Spoke with patient and informed her that we have Qulipta samples for her to use while we are appealing the denial for her medication- will  at [place]

## 2023-05-03 ENCOUNTER — OFFICE VISIT (OUTPATIENT)
Dept: INTERNAL MEDICINE | Facility: CLINIC | Age: 33
End: 2023-05-03
Payer: COMMERCIAL

## 2023-05-03 VITALS
BODY MASS INDEX: 19.98 KG/M2 | OXYGEN SATURATION: 100 % | SYSTOLIC BLOOD PRESSURE: 102 MMHG | HEIGHT: 57 IN | HEART RATE: 71 BPM | DIASTOLIC BLOOD PRESSURE: 67 MMHG | WEIGHT: 92.6 LBS

## 2023-05-03 DIAGNOSIS — G43.819 OTHER MIGRAINE WITHOUT STATUS MIGRAINOSUS, INTRACTABLE: Primary | ICD-10-CM

## 2023-05-03 NOTE — ASSESSMENT & PLAN NOTE
Headaches are improving with treatment.  Continue current treatment regimen.  Following with neurology.   [place] paperwork completed in office today.   Continues with occipital nerve injections.   F/u with neurology as scheduled.

## 2023-05-03 NOTE — PROGRESS NOTES
"Chief Complaint  Migraine and Follow-up    Subjective        Lolis Ruiz presents to [facility]  History of Present Illness  This is a 31 y/o female presenting to office for complaints of ongoing migraines. Patient reports she is having migraines 2-3 migraines weekly. Patient reports some improvements in headaches. Had occipital neuralgia nerve block on 3/10/23. Patient is following with neurology. Patient prescribed qlipta daily, nurtec EOD, maxalt PRN, trudhesa PRN. Patient reports she has better range of motion in her neck and head. Patient reports she is still struggling with migraines. Patient also doing physical therapy for occipital pain. Patient reports sometimes the migraines are still severe, lasting 3 days.       Objective   Vital Signs:  /67 (BP Location: Left arm, Patient Position: Sitting, Cuff Size: Adult)   Pulse 71   Ht 144.8 cm (57\")   Wt 42 kg (92 lb 9.6 oz)   SpO2 100%   BMI 20.04 kg/m²   Estimated body mass index is 20.04 kg/m² as calculated from the following:    Height as of this encounter: 144.8 cm (57\").    Weight as of this encounter: 42 kg (92 lb 9.6 oz).       BMI is within normal parameters. No other follow-up for BMI required.      Physical Exam  Constitutional:       Appearance: Normal appearance.   HENT:      Head: Normocephalic and atraumatic.      Right Ear: External ear normal.      Left Ear: External ear normal.      Nose: Nose normal.      Mouth/Throat:      Mouth: Mucous membranes are moist.      Pharynx: Oropharynx is clear.   Eyes:      Conjunctiva/sclera: Conjunctivae normal.      Pupils: Pupils are equal, round, and reactive to light.   Pulmonary:      Effort: Pulmonary effort is normal.   Skin:     General: Skin is warm and dry.   Neurological:      General: No focal deficit present.      Mental Status: She is alert and oriented to person, place, and time. Mental status is at baseline.      Motor: No weakness.   Psychiatric:    "      Mood and Affect: Mood normal.         Thought Content: Thought content normal.         Judgment: Judgment normal.        Result Review :  The following data was reviewed by: CIERRA Haas on 05/03/2023:                   Assessment and Plan   Diagnoses and all orders for this visit:    1. Other migraine without status migrainosus, intractable (Primary)  Assessment & Plan:  Headaches are improving with treatment.  Continue current treatment regimen.  Following with neurology.   FMLA paperwork completed in office today.   Continues with occipital nerve injections.   F/u with neurology as scheduled.              Follow Up   Return for Next scheduled follow up 6/28/23.  Patient was given instructions and counseling regarding her condition or for health maintenance advice. Please see specific information pulled into the AVS if appropriate.

## 2023-05-18 ENCOUNTER — TELEPHONE (OUTPATIENT)
Dept: NEUROLOGY | Facility: CLINIC | Age: 33
End: 2023-05-18

## 2023-05-18 NOTE — TELEPHONE ENCOUNTER
SPOKE WITH PATIENT AND SHE IS WANTING TO KNOW IF WE CAN TRY AND GET QULIPTA APPROVED FOR HER, INSURANCE DENIED PREVIOUSLY    SHE HAS BEEN USING QULIPTA SAMPLES AND IT DOES HELP.  SHE HAS BEEN USING [employer] [facility]RU [facility] TO GET NURTEC AND THAT PROGRAM IS RUNNING OUT AND WOULD LIKE TO SEE IF WE CAN GET THAT APPROVED AS WELL    SHE HAS TRIED [facility] IN THE PAST AND IT DID NOT WORK    EMMANUEL, CAN YOU ASSIST WITH THIS ?

## 2023-05-18 NOTE — TELEPHONE ENCOUNTER
Caller: CIARA     Relationship: [facility]    Best call back number:[phone]    PHARMACY NEEDS A VERBAL OR FAX P.A ASAP , HAVE ALREADY SENT OVER 3 FAX REQUEST     FAX # [phone]    Requested Prescriptions: NURTEC   Requested Prescriptions      No prescriptions requested or ordered in this encounter        Pharmacy where request should be sent:      Last office visit with prescribing clinician: 3/9/2023   Last telemedicine visit with prescribing clinician: 3/13/2023   Next office visit with prescribing clinician: 6/9/2023     Additional details provided by patient:  MARIYA CALLING NEEDS P.A SENT ASAP HAVE ALREADY SENT 3 FAX     Does the patient have less than a 3 day supply:  [x] Yes  [] No    Would you like a call back once the refill request has been completed: [] Yes [] No    If the office needs to give you a call back, can they leave a voicemail: [] Yes [] No    Anders Wong Rep   05/18/23 12:56 EDT

## 2023-06-02 ENCOUNTER — TELEPHONE (OUTPATIENT)
Dept: NEUROLOGY | Facility: CLINIC | Age: 33
End: 2023-06-02

## 2023-06-02 NOTE — TELEPHONE ENCOUNTER
Tommie Rodriguez MD McGaughey, Cheryl, MA Hey Cheryl, I saw that you messaged [employer] for this patient regarding Qulipta.  I tried to reorder the Qulipta which may reinitiate another prior authorization form.  Since I did the appeal and you submitted it, Qulipta was approved for chronic migraines which was the condition it was denied for.  I actually think that she has nonchronic migraines with an exacerbation but I am reordering and hopefully we can get this approved for her.  Also she noted that she is running out over the bridge program.  There is a new co-pay card that can be found at [employer].  She can sign up for this online and activate her co-pay card and I believe that if she brings this to the [facility] specialty pharmacy she should be able to get this for $0.  These medicines have similar mechanisms of action and it may be beneficial to try to get a different medication approved besides Qulipta.  I can discuss these options with her further at her next appointment.     Things to do:     -Call the patient and let her know about [email or web address] so that she can sign up for a co-pay card and once approved give that information to the [facility] specialty pharmacy   -Let the patient know that I reordered Qulipta to see if the approval if restarted could be better approved, and that we will discuss this as well as other options at follow-up.

## 2023-06-02 NOTE — TELEPHONE ENCOUNTER
LM FOR PATIENT RELAYING DR GRAHAM MESSAGE REGARDING THE QULIPTA AND THE [employer] SAVINGS CARD    DR GRAHAM SENT NEW SCRIPT FOR QULIPTA TO [facility] AND [facility]

## 2023-06-09 ENCOUNTER — OFFICE VISIT (OUTPATIENT)
Dept: NEUROLOGY | Facility: CLINIC | Age: 33
End: 2023-06-09
Payer: COMMERCIAL

## 2023-06-09 VITALS
HEART RATE: 83 BPM | OXYGEN SATURATION: 97 % | HEIGHT: 57 IN | WEIGHT: 93 LBS | SYSTOLIC BLOOD PRESSURE: 116 MMHG | DIASTOLIC BLOOD PRESSURE: 84 MMHG | BODY MASS INDEX: 20.06 KG/M2

## 2023-06-09 DIAGNOSIS — M54.81 BILATERAL OCCIPITAL NEURALGIA: ICD-10-CM

## 2023-06-09 DIAGNOSIS — G43.109 MIGRAINE WITH AURA AND WITHOUT STATUS MIGRAINOSUS, NOT INTRACTABLE: Primary | ICD-10-CM

## 2023-06-09 RX ORDER — RIZATRIPTAN BENZOATE 10 MG/1
10 TABLET ORAL ONCE AS NEEDED
Qty: 9 TABLET | Refills: 5 | Status: SHIPPED | OUTPATIENT
Start: 2023-06-09 | End: 2023-12-06

## 2023-06-09 RX ORDER — ZONISAMIDE 100 MG/1
200 CAPSULE ORAL DAILY
Qty: 60 CAPSULE | Refills: 5 | Status: SHIPPED | OUTPATIENT
Start: 2023-06-09

## 2023-06-09 RX ORDER — FROVATRIPTAN SUCCINATE 2.5 MG/1
2.5 TABLET, FILM COATED ORAL ONCE AS NEEDED
Qty: 7 TABLET | Refills: 0 | Status: SHIPPED | OUTPATIENT
Start: 2023-06-09 | End: 2023-07-09

## 2023-06-09 NOTE — PROGRESS NOTES
Chief Complaint   Patient presents with    Headache       Patient ID: Lolis Ruiz is a 33 y.o. female.    HPI:    The following portions of the patient's history were reviewed and updated as appropriate: allergies, current medications, past family history, past medical history, past social history, past surgical history and problem list.    Interval history:    Ms. Lolis Ruiz is presenting for follow-up of migraine headaches. She has previously been a variety of medications including propranolol, topiramate, duloxetine, and riboflavin. She has had greater occipital nerve block. She has also tried magnesium, metoprolol, propranolol, sumatriptan, and rizatriptan.    Today, the patient reports the injections have helped. She notes being off work and having less stress has helped. She reports she had 2.5 weeks without a headache or migraine at all, but they have been coming back again. She states she is back up to 3.5 migraines a week again. She reports she has 8 tablets of Qulipta left. She states she is still taking rizatriptan as needed and needs a refill. She notes it helps sometimes when she takes it. She states on 06/07/2023 she had a really bad migraine, vomiting, and passed out. She notes she had taken the rizatriptan 2 hours before it got significantly worse. She states some days it helps and some days it does not. She notes she has tried Trudhesa as well, but it did not do anything. She states she has not heard anything about the prescription for Qulipta yet. She states she has tried riboflavin and magnesium in the past, but did not notice that much of an improvement. She tried topiramate in the past and it didn't help. She states she has tried Fioricet in the past, but do not think she got any benefit from it.  Based on relief with GONB and 3.5 migraine days a week, she has 14 migraine days or less a month on average.       The patient states her FMLA is ending soon, but they hired her replacement when  she go back to work. She states it has been going good. She notes she is not upset about not being there. She states it is taking some time to transition her replacement.      Review of Systems   Constitutional: Negative.    Eyes: Negative.    Respiratory: Negative.     Cardiovascular: Negative.    Gastrointestinal: Negative.    Endocrine: Negative.    Genitourinary: Negative.    Musculoskeletal: Negative.    Allergic/Immunologic: Negative.    Neurological:  Positive for headaches.   Hematological: Negative.       JENA got rid of migraines for 2 and a half weeks    Vitals:    06/09/23 1016   BP: 116/84   Pulse: 83   SpO2: 97%       Neurologic Exam    Physical Exam    Procedures    Assessment/Plan:      1. Migraines.  - She will continue her Qulipta samples, resent Rx last week to see if auth needed.  She has tried several formulary alternatives including topiramate and propranolol in the past for headaches and did not get relief.  She has 14 days of migraine or less per month based on a migraine frequency of 3.5 migraines per week with 2.5 weeks of relief with greater occipital nerve block every 3 months.  - For instead of every other day, I will update the prescription once we get to a stable prescription dose.  - She has gotten some partial significant but short-lived relief with greater occipital nerve block. We will continue this and see if the effect can be more prolonged from a preventive standpoint.  - We will add zonisamide 100 mg with instructions to increase to 200 mg after 1 week.  - From an abortive standpoint, we will continue rizatriptan. I have provided a refill today, but I also prescribed a short course of frovatriptan to see if Vis is more effective than rizatriptan.  - If she is not able to get the Qulipta in the next 8 days then she will let the medicine wash out over the following week and we will try to order a CGRP injectable that's covered by her insurance and give her a sample of this  medication.  - I counseled her not to take the frovatriptan and sumatriptan the same day.  - The greater occipital nerve block was affordable to the patient.    She will follow up with me after 2nd nerve block    I spent 40 minutes caring for this patient on this date of service. This time includes time spent by me in the following activities as necessary: preparing for the visit, reviewing tests, medical records and previous visits, obtaining and/or reviewing a separately obtained history, performing a medically appropriate exam and/or evaluation, counseling and educating the patient, and/or communicating with other healthcare professionals, documenting information in the medical record, independently interpreting results and communicating that information with the patient, and developing a medically appropriate treatment plan with consideration of other conditions, medications, and treatments.       Diagnoses and all orders for this visit:    1. Migraine with aura and without status migrainosus, not intractable (Primary)    2. Bilateral occipital neuralgia    Other orders  -     zonisamide (ZONEGRAN) 100 MG capsule; Take 2 capsules by mouth Daily. For the first week when starting, take 1 capsule by mouth instead.  Dispense: 60 capsule; Refill: 5  -     frovatriptan (FROVA) 2.5 MG tablet; Take 1 tablet by mouth 1 (One) Time As Needed for Migraine for up to 30 days. If recurs, may repeat after 2 hours. Max of 3 tabs in 24 hours.  Dispense: 7 tablet; Refill: 0  -     rizatriptan (Maxalt) 10 MG tablet; Take 1 tablet by mouth 1 (One) Time As Needed for Migraine for up to 180 days. May repeat in 2 hours if needed  Dispense: 9 tablet; Refill: 5           Tommie Rodriguez MD    Transcribed from ambient dictation for Tommie Rodriguez MD by Mariela Dyer.  06/09/23   12:29 EDT    Patient or patient representative verbalized consent to the visit recording.  I have personally performed the services described in this document as  transcribed by the above individual, and it is both accurate and complete.  Tommie Rodriguez MD  6/16/2023  13:24 EDT

## 2023-06-14 NOTE — PROGRESS NOTES
"Procedure   Procedures       Indication for Procedure: Occipital Neuralgia  Procedure:  Bilateral Greater Occipital Nerve Block    Risks and explained to the patient including the risk of bleeding, local infection, and local pain. Patient expressed understanding and informed consent was obtained.    Time out performed. Consent signed  around 1141 am    Bilateral occipital nerve roots and landmarks identified utilizing palpation techniques.      2 injections of 5mL of of the following medications were administered utilizing a 10 mL syringe each injection with a unused 25 gauge needle into an area surrounding the nerve root.  The mixture contained:  9 ml of 2% lidocaine without preservative  1 ml.of 40 mg/mL methylprednisolone   The patient tolerated the procedure well with no major blood loss.        After the visit she returned shortly afterwards noted numbness around the mouth and and the top of the throat. Numbness described like being at the dentist. No difficulty breathing    I asked her to place her hands on her abdomen and focus on her breathing and take deep breaths.  I assessed the patient in a patient room.    Mental status alert, oriented to name, location, off by one day on time. Corrected and asked again 3 minutes later and able to tell me  Speech - able to say phrase and p, t, c sounds, had some subjective difficulty initially saying \"cah\" but said carrot shortly afterwards well  CN II - XII appear similar to the prior visit earlier this month on direct assessment, no numbness seen when touching face around mouth  Motor appears 5/5 in terms of strength in biceps, triceps, , deltoids, iliopsoas, quadriceps, hamstring, anterior tibialis, and gastroc  Sensation, no numbness sensation in limbs or around mouth  Coordination: HTS FNF intact    Mouth sensation started to come back at 12:02, throat still feels numb.    Has had lidocaine before as well as methyprednisolone.    12:07 pm pt notes arms shaking " and tip of tongue numb. There was potentially mild tremulousness but I did not observe high amplitude shaking.    After a while longer the patient noted she was feeling better and back to herself and felt comfortable leaving.  At no point was she is in respiratory distress or was not concerned over overt signs of anaphylaxis.  I am not concerned over a stroke given her exam and the patterns of her symptoms.  I discussed that while I think it is unlikely given that she has had lidocaine numerous times before and the steroid at least once before that if she notices difficulty breathing or swallowing that she should go immediately to emergency room.  The patient endorsed understanding.    The differential includes panic attack given perioral numbness. The patient noted that one of her friends got diagnosed with breast cancer yesterday. However given the type of sensation that she had as well as the timing being shortly after procedure, its possible that this could be a complication related to the procedure.  I discussed with the patient that lidocaine lasts around 30 minutes in terms of the numbing sensation.  I cannot think of a simple anatomic pathway that would cause mouth throat and tip of the tongue numbness from bilateral greater occipital nerve block which I discussed with her.  If it got into the bloodstream somehow it would also be a little unusual to have the symptoms she described.  I discussed the following plan as such with her.  She should see how she does with the nerve block in terms of headache reduction.  She can decide if she wants to repeat this.  If we were to repeated she had the same type of reaction we would stop and discontinue for the foreseeable future.  If she wants to discontinue as well that is okay as well.

## 2023-06-16 ENCOUNTER — PROCEDURE VISIT (OUTPATIENT)
Dept: NEUROLOGY | Facility: CLINIC | Age: 33
End: 2023-06-16
Payer: COMMERCIAL

## 2023-06-16 ENCOUNTER — TELEPHONE (OUTPATIENT)
Dept: NEUROLOGY | Facility: CLINIC | Age: 33
End: 2023-06-16
Payer: COMMERCIAL

## 2023-06-16 DIAGNOSIS — M54.81 BILATERAL OCCIPITAL NEURALGIA: Primary | ICD-10-CM

## 2023-06-16 RX ORDER — METHYLPREDNISOLONE ACETATE 40 MG/ML
40 INJECTION, SUSPENSION INTRA-ARTICULAR; INTRALESIONAL; INTRAMUSCULAR; SOFT TISSUE ONCE
Status: COMPLETED | OUTPATIENT
Start: 2023-06-16 | End: 2023-06-16

## 2023-06-16 RX ORDER — LIDOCAINE HYDROCHLORIDE 20 MG/ML
9 INJECTION, SOLUTION INFILTRATION; PERINEURAL ONCE
Status: COMPLETED | OUTPATIENT
Start: 2023-06-16 | End: 2023-06-16

## 2023-06-16 RX ADMIN — METHYLPREDNISOLONE ACETATE 40 MG: 40 INJECTION, SUSPENSION INTRA-ARTICULAR; INTRALESIONAL; INTRAMUSCULAR; SOFT TISSUE at 11:24

## 2023-06-16 RX ADMIN — LIDOCAINE HYDROCHLORIDE 9 ML: 20 INJECTION, SOLUTION INFILTRATION; PERINEURAL at 11:23

## 2023-06-16 NOTE — TELEPHONE ENCOUNTER
The denied it again, stating it has already been denied before both for original PA and appeal    There is a number to call, might want to set up a appeal by phone where you can actually talk to reviewing physician    Will put on your desk for Monday

## 2023-06-28 PROBLEM — I49.1 PREMATURE ATRIAL CONTRACTION: Status: RESOLVED | Noted: 2021-04-01 | Resolved: 2023-06-28

## 2023-06-28 PROBLEM — I51.7 CARDIOMEGALY: Status: RESOLVED | Noted: 2023-03-09 | Resolved: 2023-06-28

## 2023-06-28 PROBLEM — Z00.00 ANNUAL PHYSICAL EXAM: Status: ACTIVE | Noted: 2023-06-28

## 2023-06-28 PROBLEM — D18.03 HEMANGIOMA OF LIVER: Status: ACTIVE | Noted: 2022-02-17

## 2023-06-28 PROBLEM — R55 VASOVAGAL NEAR SYNCOPE: Status: RESOLVED | Noted: 2023-02-28 | Resolved: 2023-06-28

## 2023-06-28 PROBLEM — R00.2 PALPITATIONS: Status: RESOLVED | Noted: 2021-02-24 | Resolved: 2023-06-28

## 2023-06-28 PROBLEM — R07.89 CHEST DISCOMFORT: Status: RESOLVED | Noted: 2021-02-24 | Resolved: 2023-06-28

## 2023-08-30 RX ORDER — RIMEGEPANT SULFATE 75 MG/75MG
TABLET, ORALLY DISINTEGRATING ORAL
Qty: 15 TABLET | OUTPATIENT
Start: 2023-08-30

## 2023-08-31 ENCOUNTER — OFFICE VISIT (OUTPATIENT)
Dept: OBSTETRICS AND GYNECOLOGY | Age: 33
End: 2023-08-31
Payer: COMMERCIAL

## 2023-08-31 VITALS
DIASTOLIC BLOOD PRESSURE: 62 MMHG | SYSTOLIC BLOOD PRESSURE: 102 MMHG | BODY MASS INDEX: 19.89 KG/M2 | WEIGHT: 92.2 LBS | HEIGHT: 57 IN

## 2023-08-31 DIAGNOSIS — G43.829 MENSTRUAL MIGRAINE WITHOUT STATUS MIGRAINOSUS, NOT INTRACTABLE: Primary | ICD-10-CM

## 2023-08-31 PROCEDURE — 99213 OFFICE O/P EST LOW 20 MIN: CPT | Performed by: OBSTETRICS & GYNECOLOGY

## 2023-08-31 NOTE — PROGRESS NOTES
GYN Visit    2023    Patient: Lolis Ruiz          MR#:[id number]      Chief Complaint   Patient presents with    Follow-up     Gyn F/u - Pt c/o severe migraines during menses, Pt states her migraines have intensified since stoppping BC, pt states she started injections as treatment for her migraines & it has given her some relief, pt c/o irregular menses       History of Present Illness    33 y.o. female  who presents for a problem GYN visit    The patient has a history of migraines and has been trying out new medications for this.  She notes good control except for when she is on her period  She has starts with a headache prior to her period and then through.  Her cycles are 21 to 25 days apart.  She was previously on oral contraceptive pills and does not perceive that they caused her migraine headaches.  She is a non-smoker with no history of stroke or DVT  She is not skipping any periods  She is interested in starting back on a oral contraceptive pill in order to regulate her cycles to more of a 28-day cycle.  It may also be more beneficial to her menstrual headaches.  We will ask her neurologist if she can do this from their point of view.    Kids are well 16 and 9 years old        Patient's last menstrual period was 2023 (exact date).    ________________________________________  Patient Active Problem List   Diagnosis    Migraine    Chiari I malformation    Trauma in childhood    Anemia    Daytime somnolence    Hemangioma of liver    Chiari malformation type I    Annual physical exam       Past Medical History:   Diagnosis Date    Asthma     Catscratch disease     History of medical problems     Chiari 1 malformation.  Hemangiomas on liver.  SVT'S, PVC'S, PAC'S, mild regurgitation at mitral and tricuspid valves    Kidney stone     Liver lesion     Low back pain     Spondylolysis L4    Migraine     Spondylolysis        Past Surgical History:   Procedure Laterality Date     SECTION    "   EYE SURGERY  11/2019    Lasik    WISDOM TOOTH EXTRACTION  2011       Social History     Tobacco Use   Smoking Status Never   Smokeless Tobacco Never       has a current medication list which includes the following prescription(s): acetaminophen, ibuprofen, rizatriptan, zonisamide, atogepant, and frovatriptan.  ________________________________________    Current contraception: vasectomy      The following portions of the patient's history were reviewed and updated as appropriate: allergies, current medications, past family history, past medical history, past social history, past surgical history, and problem list.    Review of Systems    Pertinent items are noted in HPI.     Objective   Physical Exam    /62   Ht 144 cm (56.69\")   Wt 41.8 kg (92 lb 3.2 oz)   LMP 08/11/2023 (Exact Date)   BMI 20.17 kg/mý    BP Readings from Last 3 Encounters:   08/31/23 102/62   06/28/23 98/80   06/09/23 116/84      Wt Readings from Last 3 Encounters:   08/31/23 41.8 kg (92 lb 3.2 oz)   06/28/23 41.3 kg (91 lb)   06/09/23 42.2 kg (93 lb)      BMI: Estimated body mass index is 20.17 kg/mý as calculated from the following:    Height as of this encounter: 144 cm (56.69\").    Weight as of this encounter: 41.8 kg (92 lb 3.2 oz).    Lungs: non labored breathing, no wheezing or tachpnea  Extremities: extremities normal, atraumatic, no cyanosis or edema  Skin: Skin color, texture, turgor normal. No rashes or lesions  Neurologic: Grossly normal  General:   alert, appears stated age, and cooperative                                 Assessment:      Diagnoses and all orders for this visit:    1. Menstrual migraine without status migrainosus, not intractable (Primary)      Consider restarting low-dose oral contraceptive pills to regulate her cycle and to improve her menstrual migraine.  The patient stopped the birth control pill in the past and does not perceive that it made a difference in her headaches.  She does see a neurologist " and we will ask his opinion regarding low-dose birth control pills.

## 2023-08-31 NOTE — Clinical Note
[place] patient with migraines, She would like to restart her low dose OCP for period regulation and to help with menstrual migraine.  Menstrual cycle not a trigger for her in past when she was on OCPs.  She has an appt with you soon.  I can prescribe if you agree it is reasonable and safe for her to restart.

## 2023-09-01 ENCOUNTER — OFFICE VISIT (OUTPATIENT)
Dept: NEUROLOGY | Facility: CLINIC | Age: 33
End: 2023-09-01
Payer: COMMERCIAL

## 2023-09-01 VITALS
HEART RATE: 61 BPM | HEIGHT: 57 IN | DIASTOLIC BLOOD PRESSURE: 64 MMHG | BODY MASS INDEX: 19.85 KG/M2 | SYSTOLIC BLOOD PRESSURE: 98 MMHG | WEIGHT: 92 LBS | OXYGEN SATURATION: 98 %

## 2023-09-01 DIAGNOSIS — M54.81 BILATERAL OCCIPITAL NEURALGIA: Primary | ICD-10-CM

## 2023-09-01 DIAGNOSIS — G43.109 MIGRAINE WITH AURA AND WITHOUT STATUS MIGRAINOSUS, NOT INTRACTABLE: ICD-10-CM

## 2023-09-01 PROCEDURE — 99214 OFFICE O/P EST MOD 30 MIN: CPT | Performed by: STUDENT IN AN ORGANIZED HEALTH CARE EDUCATION/TRAINING PROGRAM

## 2023-09-01 RX ORDER — NORETHINDRONE ACETATE AND ETHINYL ESTRADIOL AND FERROUS FUMARATE 1MG-20(24)
1 KIT ORAL DAILY
Qty: 84 TABLET | Refills: 3 | Status: SHIPPED | OUTPATIENT
Start: 2023-09-01 | End: 2024-08-31

## 2023-09-01 RX ORDER — RIZATRIPTAN BENZOATE 10 MG/1
10 TABLET ORAL ONCE AS NEEDED
Qty: 9 TABLET | Refills: 5 | Status: SHIPPED | OUTPATIENT
Start: 2023-09-01 | End: 2024-02-28

## 2023-09-01 RX ORDER — ZONISAMIDE 100 MG/1
100 CAPSULE ORAL NIGHTLY
Qty: 90 CAPSULE | Refills: 1 | Status: SHIPPED | OUTPATIENT
Start: 2023-09-01

## 2023-09-01 NOTE — LETTER
September 1, 2023       No Recipients    Patient: Lolis Ruiz   YOB: 1990   Date of Visit: 9/1/2023     Dear CIERRA Haas:       Thank you for referring Lolis Ruiz to me for evaluation. Below are the relevant portions of my assessment and plan of care.    If you have questions, please do not hesitate to call me. I look forward to following Lolis along with you.         Sincerely,        Tommie Rodriguez MD        CC:   No Recipients    Weston Du  09/01/23 1356  Sign when Signing Visit  Chief Complaint   Patient presents with   • Migraine       Patient ID: Lolis Ruiz is a 33 y.o. female.    HPI:    Ms. Lolis Ruiz is a 33-year-old female who presents to the neurology clinic for follow-up. She is on greater occipital nerve blocks as well as Zonegran. I will need to clarify if she is taking etogepant, but it looks like she is not taking it. She was also prescribed rivatriptan 2.5 mg and she is taking this as well. She had a feeling of her arm shaking in the tip of her tongue being numb.    The patient reports that she is doing bett   er overall. She notes that she met with her OB/GYN, and she sent me a message about the frequency of her menstrual cycles. She states that her headaches have calmed down enough that she has been able to notice a little bit of a cyclical pattern recently. She notes that she made an appointment with her OB/GYN to see if they can do a hormone panel, but her OB/GYN wanted to message me about birth control. The patient reports that she stopped taking estrogen when she started having bad migraines because of the risks.  The patient reports that her menstrual cycles last 7 days. She notes that during those 7 days, she will have a migraine every day. The patient reports that her migraines started a couple of days before her menstrual cycle and lasted until a couple of days after it was over. She notes that her migraines are 2 weeks on, and 2 weeks off.     The  patient reports that she is taking zonisamide 1 pill nightly. She notes that she started having really severe hallucinations when she increased it to 2 pills daily. She notes that she is having headaches daily again over the last 3 weeks. The patient reports that the last occipital nerve block took 2 weeks to kick in. She notes that she had 2 weeks where she was migraine free. The patient reports that she noticed a couple of days before her menstrual cycle, she started a migraine. She notes that she kept it throughout her whole cycle and then a couple of days afterwards, it went away. She states that she was migraine free again for a couple of weeks until her cycle started again. The patient reports that she is open to doing the nerve block again. She denies trying any CGRP medications.     The patient reports that she experienced a really loud ringing in her right ear last week while she was driving. She notes that she lost her hearing completely for 30 seconds to 1 minute. The patient reports that it came back, and within 10 to 15 minutes, she had a migraine. She notes that she took the rizatriptan and laid down in a dark room. The patient reports that it was the only thing that made it better. The patient reports that she does not have any insurance this year. She notes that short-term disability is pending. The patient reports that she received some paperwork regarding short-term disability. She notes that she is working part time at the same place, but she is not coming back permanently. The patient reports that she works in a very stress-free position that she is doing from home.        The following portions of the patient's history were reviewed and updated as appropriate: allergies, current medications, past family history, past medical history, past social history, past surgical history and problem list.    Interval history:    Review of Systems   HENT:  Positive for hearing loss (right ear last week  before migraine) and tinnitus.    Eyes:  Positive for photophobia and visual disturbance.   Gastrointestinal:  Positive for nausea and vomiting.   Allergic/Immunologic: Negative for environmental allergies, food allergies and immunocompromised state.   Neurological:  Positive for light-headedness and headaches. Negative for dizziness, tremors, seizures, syncope, facial asymmetry, speech difficulty, weakness and numbness.   Hematological:  Negative for adenopathy. Does not bruise/bleed easily.    ***    Vitals:    23 1029   BP: 98/64   Pulse: 61   SpO2: 98%       Neurologic Exam     Mental Status   Speech: speech is normal   Level of consciousness: alert    Cranial Nerves     CN II   Visual fields full to confrontation.     CN III, IV, VI   Pupils are equal, round, and reactive to light.  Extraocular motions are normal.     CN V   Facial sensation intact.     CN VII   Facial expression full, symmetric.     CN VIII   Hearing: intact    CN IX, X   Palate: symmetric    CN XI   Right trapezius strength: normal  Left trapezius strength: normal    CN XII   Tongue: not atrophic  Fasciculations: absent  Tongue deviation: none    Motor Exam   Muscle bulk: normal    Strength   Right deltoid: 5/5  Left deltoid: 5/5  Right biceps: 5/5  Left biceps: 5/5  Right triceps: 5/5  Left triceps: 5/5  Right iliopsoas: 5/5  Left iliopsoas: 5/5  Right quadriceps: 5/5  Left quadriceps: 5/5  Right hamstrin/5  Left hamstrin/5  Right anterior tibial: 5/5  Left anterior tibial: 5/5  Right gastroc: 5/5  Left gastroc: 5/5Grip 5 out of 5 bilaterally     Sensory Exam   Right arm light touch: normal  Left arm light touch: normal  Right leg light touch: normal  Left leg light touch: normal    Gait, Coordination, and Reflexes     Coordination   Finger to nose coordination: normal  Heel to shin coordination: normal    Reflexes   Right brachioradialis: 2+  Left brachioradialis: 2+  Right biceps: 2+  Left biceps: 2+  Right patellar: 2+  Left  "patellar: 2+  Right achilles: 2+  Left achilles: 2+    Physical Exam  Eyes:      Extraocular Movements: EOM normal.      Pupils: Pupils are equal, round, and reactive to light.   Neurological:      Coordination: Finger-Nose-Finger Test and Heel to Shin Test normal.      Deep Tendon Reflexes:      Reflex Scores:       Bicep reflexes are 2+ on the right side and 2+ on the left side.       Brachioradialis reflexes are 2+ on the right side and 2+ on the left side.       Patellar reflexes are 2+ on the right side and 2+ on the left side.       Achilles reflexes are 2+ on the right side and 2+ on the left side.  Psychiatric:         Speech: Speech normal.     Procedures    Assessment/Plan:         There are no diagnoses linked to this encounter.            The patient will continue taking zonisamide 100 mg nightly. Samples of Ajovy were provided today. She will continue taking rizatriptan and ibuprofen as needed. The patient was scheduled to receive another occipital nerve block on 09/22/2023.           She will follow up in 4 months to discuss how she responds to the injection.     Transcribed from ambient dictation for Tommie Rodriguez MD by Weston Du.  09/01/23   13:56 EDT    {[facility] Provider Statement:99128::\"Patient or patient representative verbalized consent to the visit recording.\",\"I have personally performed the services described in this document as transcribed by the above individual, and it is both accurate and complete.\"}              Tommie Rodriguez MD  09/01/23 1109  Incomplete  Chief Complaint   Patient presents with   • Migraine       Patient ID: Lolis Ruiz is a 33 y.o. female.    HPI:    The following portions of the patient's history were reviewed and updated as appropriate: allergies, current medications, past family history, past medical history, past social history, past surgical history and problem list.    Interval history:    Review of Systems   HENT:  Positive for hearing loss (right ear last " week before migraine) and tinnitus.    Eyes:  Positive for photophobia and visual disturbance.   Gastrointestinal:  Positive for nausea and vomiting.   Allergic/Immunologic: Negative for environmental allergies, food allergies and immunocompromised state.   Neurological:  Positive for light-headedness and headaches. Negative for dizziness, tremors, seizures, syncope, facial asymmetry, speech difficulty, weakness and numbness.   Hematological:  Negative for adenopathy. Does not bruise/bleed easily.    ***    There were no vitals filed for this visit.    Neurologic Exam     Mental Status   Speech: speech is normal   Level of consciousness: alert    Cranial Nerves     CN II   Visual fields full to confrontation.     CN III, IV, VI   Pupils are equal, round, and reactive to light.  Extraocular motions are normal.     CN V   Facial sensation intact.     CN VII   Facial expression full, symmetric.     CN VIII   Hearing: intact    CN IX, X   Palate: symmetric    CN XI   Right trapezius strength: normal  Left trapezius strength: normal    CN XII   Tongue: not atrophic  Fasciculations: absent  Tongue deviation: none    Motor Exam   Muscle bulk: normal    Strength   Right deltoid: 5/5  Left deltoid: 5/5  Right biceps: 5/5  Left biceps: 5/5  Right triceps: 5/5  Left triceps: 5/5  Right iliopsoas: 5/5  Left iliopsoas: 5/5  Right quadriceps: 5/5  Left quadriceps: 5/5  Right hamstrin/5  Left hamstrin/5  Right anterior tibial: 5/5  Left anterior tibial: 5/5  Right gastroc: 5/5  Left gastroc: 5/5Grip 5 out of 5 bilaterally     Sensory Exam   Right arm light touch: normal  Left arm light touch: normal  Right leg light touch: normal  Left leg light touch: normal    Gait, Coordination, and Reflexes     Coordination   Finger to nose coordination: normal  Heel to shin coordination: normal    Reflexes   Right brachioradialis: 2+  Left brachioradialis: 2+  Right biceps: 2+  Left biceps: 2+  Right patellar: 2+  Left patellar:  2+  Right achilles: 2+  Left achilles: 2+    Physical Exam  Eyes:      Extraocular Movements: EOM normal.      Pupils: Pupils are equal, round, and reactive to light.   Neurological:      Coordination: Finger-Nose-Finger Test and Heel to Shin Test normal.      Deep Tendon Reflexes:      Reflex Scores:       Bicep reflexes are 2+ on the right side and 2+ on the left side.       Brachioradialis reflexes are 2+ on the right side and 2+ on the left side.       Patellar reflexes are 2+ on the right side and 2+ on the left side.       Achilles reflexes are 2+ on the right side and 2+ on the left side.  Psychiatric:         Speech: Speech normal.     Procedures    Assessment/Plan:         There are no diagnoses linked to this encounter.       Inocencia Colon MA

## 2023-09-01 NOTE — PROGRESS NOTES
Chief Complaint   Patient presents with    Migraine       Patient ID: Lolis Ruiz is a 33 y.o. female.    HPI:  Interval history:  Ms. Lolis Ruiz is a 33-year-old female who presents to the neurology clinic for follow-up. She is on greater occipital nerve blocks as well as Zonegran. I will need to clarify if she is taking atogepant, but it looks like she is not taking it. She was also prescribed rizatriptan 10 mg and she is taking this as well. She had a feeling of her arm shaking in the tip of her tongue being numb.    The patient reports that she is doing better overall. She states that her headaches have calmed down enough that she has been able to notice a little bit of a cyclical pattern recently. She notes that she made an appointment with her OB/GYN to see if they can do a hormone panel, but her OB/GYN wanted to message me about birth control. The patient reports that she stopped taking estrogen when she started having bad migraines because of the risks.  The patient reports that her menstrual cycles last 7 days. She notes that during those 7 days, she will have a migraine every day. The patient reports that her migraines started a couple of days before her menstrual cycle and lasted until a couple of days after it was over. She notes that her migraines are 2 weeks on, and 2 weeks off.     The patient reports that she is taking zonisamide 1 pill nightly. She notes that she started having hallucinations when she increased it to 2 pills daily. She notes that she is having headaches daily again over the last 3 weeks. The patient reports that the last occipital nerve block took 2 weeks to kick in. She notes that she had 2 weeks where she was migraine free. The patient reports that she noticed a couple of days before her menstrual cycle, she started a migraine. She notes that she kept it throughout her whole cycle and then a couple of days afterwards, it went away. She states that she was migraine free again  for a couple of weeks until her cycle started again. The patient reports that she is open to doing the nerve block again. She denies taking atogepant continuously.    The patient reports that she experienced a really loud ringing in her right ear last week while she was driving. She notes that she lost her hearing completely for 30 seconds to 1 minute. The patient reports that it came back, and within 10 to 15 minutes, she had a migraine. She notes that she took the rizatriptan and laid down in a dark room. The patient reports that it was the only thing that made it better. The patient reports that she does not have any insurance this year. She notes that short-term disability is pending. The patient reports that she received some paperwork regarding short-term disability. She notes that she is working part time at the same place, but she is not coming back permanently. The patient reports that she works in a very stress-free position that she is doing from home.        The following portions of the patient's history were reviewed and updated as appropriate: allergies, current medications, past family history, past medical history, past social history, past surgical history and problem list.        Review of Systems   HENT:  Positive for hearing loss (right ear last week before migraine) and tinnitus.    Eyes:  Positive for photophobia and visual disturbance.   Gastrointestinal:  Positive for nausea and vomiting.   Allergic/Immunologic: Negative for environmental allergies, food allergies and immunocompromised state.   Neurological:  Positive for light-headedness and headaches. Negative for dizziness, tremors, seizures, syncope, facial asymmetry, speech difficulty, weakness and numbness.   Hematological:  Negative for adenopathy. Does not bruise/bleed easily.         Vitals:    09/01/23 1029   BP: 98/64   Pulse: 61   SpO2: 98%       Neurologic Exam     Mental Status   Speech: speech is normal   Level of  consciousness: alert    Cranial Nerves     CN II   Visual fields full to confrontation.     CN III, IV, VI   Pupils are equal, round, and reactive to light.  Extraocular motions are normal.     CN V   Facial sensation intact.     CN VII   Facial expression full, symmetric.     CN VIII   Hearing: intact    CN IX, X   Palate: symmetric    CN XI   Right trapezius strength: normal  Left trapezius strength: normal    CN XII   Tongue: not atrophic  Fasciculations: absent  Tongue deviation: none    Motor Exam   Muscle bulk: normal    Strength   Right deltoid: 5/5  Left deltoid: 5/5  Right biceps: 5/5  Left biceps: 5/5  Right triceps: 5/5  Left triceps: 5/5  Right iliopsoas: 5/5  Left iliopsoas: 5/5  Right quadriceps: 5/5  Left quadriceps: 5/5  Right hamstrin/5  Left hamstrin/5  Right anterior tibial: 5/5  Left anterior tibial: 5/5  Right gastroc: 5/5  Left gastroc: 5/5Grip 5 out of 5 bilaterally     Sensory Exam   Right arm light touch: normal  Left arm light touch: normal  Right leg light touch: normal  Left leg light touch: normal    Gait, Coordination, and Reflexes     Coordination   Finger to nose coordination: normal  Heel to shin coordination: normal    Reflexes   Right brachioradialis: 2+  Left brachioradialis: 2+  Right biceps: 2+  Left biceps: 2+  Right patellar: 2+  Left patellar: 2+  Right achilles: 2+  Left achilles: 2+    Physical Exam  Eyes:      Extraocular Movements: EOM normal.      Pupils: Pupils are equal, round, and reactive to light.   Neurological:      Coordination: Finger-Nose-Finger Test and Heel to Shin Test normal.      Deep Tendon Reflexes:      Reflex Scores:       Bicep reflexes are 2+ on the right side and 2+ on the left side.       Brachioradialis reflexes are 2+ on the right side and 2+ on the left side.       Patellar reflexes are 2+ on the right side and 2+ on the left side.       Achilles reflexes are 2+ on the right side and 2+ on the left side.  Psychiatric:         Speech:  Speech normal.     Procedures    Assessment/Plan:         There are no diagnoses linked to this encounter.            The patient will continue taking zonisamide 100 mg nightly. Samples of Ajovy were provided today. She will continue taking rizatriptan and ibuprofen as needed. The patient was scheduled to receive another occipital nerve block on 09/22/2023. Start Ajovy if able to be covered for migraines. Pt with episodic migraine with at least 7 severe migraines a month.  The patient has 2 chronic conditions which are stable.  One is occipital neuralgia which we will continue to monitor and trial additional occipital nerve block for.  The other is episodic migraine which she will use rizatriptan and ibuprofen for an abortive and zonisamide and Ajovy as preventative. MDM level 4    She will follow up in 4 months to discuss how she responds to the injection.     Transcribed from ambient dictation for Tommie Rodriguez MD by Weston Du.  09/01/23   13:56 EDT    Patient or patient representative verbalized consent to the visit recording.  I have personally performed the services described in this document as transcribed by the above individual, and it is both accurate and complete.  Tommie Rodriguez MD  9/16/2023  02:15 EDT

## 2023-09-22 ENCOUNTER — PROCEDURE VISIT (OUTPATIENT)
Dept: NEUROLOGY | Facility: CLINIC | Age: 33
End: 2023-09-22
Payer: COMMERCIAL

## 2023-09-22 DIAGNOSIS — M54.81 BILATERAL OCCIPITAL NEURALGIA: Primary | ICD-10-CM

## 2023-09-22 RX ORDER — METHYLPREDNISOLONE ACETATE 40 MG/ML
40 INJECTION, SUSPENSION INTRA-ARTICULAR; INTRALESIONAL; INTRAMUSCULAR; SOFT TISSUE ONCE
Status: COMPLETED | OUTPATIENT
Start: 2023-09-22 | End: 2023-09-22

## 2023-09-22 RX ORDER — LIDOCAINE HYDROCHLORIDE 10 MG/ML
9 INJECTION, SOLUTION INFILTRATION; PERINEURAL ONCE
Status: COMPLETED | OUTPATIENT
Start: 2023-09-22 | End: 2023-09-22

## 2023-09-22 RX ADMIN — METHYLPREDNISOLONE ACETATE 40 MG: 40 INJECTION, SUSPENSION INTRA-ARTICULAR; INTRALESIONAL; INTRAMUSCULAR; SOFT TISSUE at 11:39

## 2023-09-22 RX ADMIN — LIDOCAINE HYDROCHLORIDE 9 ML: 10 INJECTION, SOLUTION INFILTRATION; PERINEURAL at 11:38

## 2023-09-22 NOTE — PROGRESS NOTES
Procedure   Procedures             Indication for Procedure: Occipital Neuralgia  Procedure:  Bilateral Greater Occipital Nerve Block     Risks and explained to the patient including the risk of bleeding, local infection, and local pain. Patient expressed understanding and informed consent was obtained.   4 weeks of no migraine with GONB, 2 weeks to get started on working. Started Ajovy with change in estrogen medications. Hasn't had a headache in several days and thinks it's less severe and less frequent with current regimen of medicaiton and GONB.    Time out performed.      Bilateral occipital nerve roots and landmarks identified utilizing palpation techniques.       2 injections of 5mL of of the following medications were administered utilizing a 10 mL syringe each injection with a unused 25 gauge needle into an area surrounding the nerve root.  The mixture contained:  9 ml of 1% lidocaine without preservative  1 ml.of 40 mg/mL methylprednisolone   The patient tolerated the procedure well with no major blood loss.

## 2023-10-09 ENCOUNTER — DOCUMENTATION (OUTPATIENT)
Dept: NEUROLOGY | Facility: CLINIC | Age: 33
End: 2023-10-09
Payer: COMMERCIAL

## 2024-01-29 ENCOUNTER — TELEPHONE (OUTPATIENT)
Dept: NEUROLOGY | Facility: CLINIC | Age: 34
End: 2024-01-29
Payer: COMMERCIAL

## 2024-01-29 RX ORDER — FREMANEZUMAB-VFRM 225 MG/1.5ML
225 INJECTION SUBCUTANEOUS
Qty: 1.5 ML | Refills: 11 | Status: SHIPPED | OUTPATIENT
Start: 2024-01-29

## 2024-01-29 NOTE — TELEPHONE ENCOUNTER
[facility] sent a fax stating Auto injector for Ajovy is unavailable. [facility] is asking for pre filled syringes instead

## 2024-03-14 ENCOUNTER — OFFICE VISIT (OUTPATIENT)
Dept: OBSTETRICS AND GYNECOLOGY | Age: 34
End: 2024-03-14
Payer: COMMERCIAL

## 2024-03-14 VITALS
WEIGHT: 90.4 LBS | BODY MASS INDEX: 19.5 KG/M2 | SYSTOLIC BLOOD PRESSURE: 108 MMHG | DIASTOLIC BLOOD PRESSURE: 72 MMHG | HEIGHT: 57 IN

## 2024-03-14 DIAGNOSIS — Z01.419 ENCOUNTER FOR GYNECOLOGICAL EXAMINATION WITHOUT ABNORMAL FINDING: Primary | ICD-10-CM

## 2024-03-14 PROCEDURE — 99395 PREV VISIT EST AGE 18-39: CPT | Performed by: OBSTETRICS & GYNECOLOGY

## 2024-03-14 RX ORDER — ZOLMITRIPTAN 5 MG/1
TABLET, FILM COATED ORAL
COMMUNITY

## 2024-03-14 RX ORDER — NORETHINDRONE ACETATE AND ETHINYL ESTRADIOL AND FERROUS FUMARATE 1MG-20(24)
1 KIT ORAL DAILY
Qty: 84 TABLET | Refills: 3 | Status: SHIPPED | OUTPATIENT
Start: 2024-03-14 | End: 2025-03-14

## 2024-03-14 NOTE — PROGRESS NOTES
Routine Annual Visit    3/14/2024    Patient: Lolis Ruiz          MR#:[id number]      Chief Complaint   Patient presents with    Gynecologic Exam     Annual Exam - last pap 21 neg, pt has no complaints today       History of Present Illness    33 y.o. female  who presents for annual exam.     Patient is feeling well today  She is on Loestrin 24 and doing well with this  It is actually helping her headaches  Vasectomy for birth control method  Kids are 16 and 9  She is up-to-date on her Pap smear      No LMP recorded (lmp unknown).  Obstetric History:  OB History          2    Para   2    Term   2            AB        Living   2         SAB        IAB        Ectopic        Molar        Multiple        Live Births   2               Menstrual History:     No LMP recorded (lmp unknown).       Sexual History:       ________________________________________  Patient Active Problem List   Diagnosis    Migraine    Chiari I malformation    Trauma in childhood    Anemia    Daytime somnolence    Hemangioma of liver    Chiari malformation type I    Annual physical exam       Past Medical History:   Diagnosis Date    ADHD     Asthma     Catscratch disease     History of medical problems     Chiari 1 malformation.  Hemangiomas on liver.  SVT'S, PVC'S, PAC'S, mild regurgitation at mitral and tricuspid valves    Kidney stone     Liver lesion     Low back pain     Spondylolysis L4    Migraine     Spondylolysis        Past Surgical History:   Procedure Laterality Date     SECTION      EYE SURGERY  2019    Lasik    WISDOM TOOTH EXTRACTION         Social History     Tobacco Use   Smoking Status Never   Smokeless Tobacco Never       has a current medication list which includes the following prescription(s): acetaminophen, ajovy, ibuprofen, norethindrone-ethinyl estradiol-ferrous fumarate, onabotulinumtoxina 5 Units/0.1 mL in Sodium Chloride (PF) injection injection, zolmitriptan, zonisamide,  "and rizatriptan.  ________________________________________    Current contraception: vasectomy  History of abnormal Pap smear: no  Family history of Breast cancer: GM 50s        The following portions of the patient's history were reviewed and updated as appropriate: allergies, current medications, past family history, past medical history, past social history, past surgical history, and problem list.    Review of Systems    Pertinent items are noted in HPI.     Objective   Physical Exam    /72   Ht 144 cm (56.69\")   Wt 41 kg (90 lb 6.4 oz)   LMP  (LMP Unknown)   BMI 19.78 kg/m²    BP Readings from Last 3 Encounters:   03/14/24 108/72   09/01/23 98/64   08/31/23 102/62      Wt Readings from Last 3 Encounters:   03/14/24 41 kg (90 lb 6.4 oz)   09/01/23 41.7 kg (92 lb)   08/31/23 41.8 kg (92 lb 3.2 oz)      BMI: Estimated body mass index is 19.78 kg/m² as calculated from the following:    Height as of this encounter: 144 cm (56.69\").    Weight as of this encounter: 41 kg (90 lb 6.4 oz).      General:   alert, appears stated age, and cooperative   Abdomen: soft, non-tender, without masses or organomegaly   Breast: inspection negative, no nipple discharge or bleeding, no masses or nodularity palpable   Vulva: normal   Vagina: normal mucosa   Cervix: no cervical motion tenderness and no lesions   Uterus: normal size, mobile, and non-tender   Adnexa: no mass, fullness, tenderness     Assessment:    1. Normal annual exam   Assessment     ICD-10-CM ICD-9-CM   1. Encounter for gynecological examination without abnormal finding  Z01.419 V72.31     Plan:    Plan       []  PAP done  []  Labs:   []  GC/Chl/TV          Diagnoses and all orders for this visit:    1. Encounter for gynecological examination without abnormal finding (Primary)    Other orders  -     norethindrone-ethinyl estradiol-ferrous fumarate (LOESTIN 24 FE) 1-20 MG-MCG(24) per tablet; Take 1 tablet by mouth Daily.  Dispense: 84 tablet; Refill: " 3            Counseling:  --Nutrition: Stressed importance of moderation and caloric balance, stressed fresh fruit and vegetables  --Exercise: Stressed the importance of regular exercise. 3-5 times weekly       --Discussed pap smear screening recommendations

## 2024-07-01 ENCOUNTER — OFFICE VISIT (OUTPATIENT)
Dept: INTERNAL MEDICINE | Facility: CLINIC | Age: 34
End: 2024-07-01
Payer: COMMERCIAL

## 2024-07-01 VITALS
HEART RATE: 78 BPM | SYSTOLIC BLOOD PRESSURE: 102 MMHG | WEIGHT: 97.6 LBS | DIASTOLIC BLOOD PRESSURE: 70 MMHG | HEIGHT: 57 IN | OXYGEN SATURATION: 100 % | BODY MASS INDEX: 21.06 KG/M2 | TEMPERATURE: 97.7 F | RESPIRATION RATE: 18 BRPM

## 2024-07-01 DIAGNOSIS — Z00.00 ANNUAL PHYSICAL EXAM: Primary | ICD-10-CM

## 2024-07-01 DIAGNOSIS — G43.819 OTHER MIGRAINE WITHOUT STATUS MIGRAINOSUS, INTRACTABLE: Chronic | ICD-10-CM

## 2024-07-01 DIAGNOSIS — F90.0 ATTENTION DEFICIT HYPERACTIVITY DISORDER (ADHD), PREDOMINANTLY INATTENTIVE TYPE: ICD-10-CM

## 2024-07-01 PROBLEM — D18.03 HEMANGIOMA OF LIVER: Status: RESOLVED | Noted: 2022-02-17 | Resolved: 2024-07-01

## 2024-07-01 PROCEDURE — 99395 PREV VISIT EST AGE 18-39: CPT | Performed by: NURSE PRACTITIONER

## 2024-07-01 RX ORDER — ATOMOXETINE 18 MG/1
18 CAPSULE ORAL DAILY
COMMUNITY

## 2024-07-01 RX ORDER — DICLOFENAC SODIUM 75 MG/1
75 TABLET, DELAYED RELEASE ORAL
COMMUNITY
Start: 2024-04-24

## 2024-07-01 RX ORDER — ATOMOXETINE 25 MG/1
25 CAPSULE ORAL DAILY
COMMUNITY
End: 2024-07-01

## 2024-07-01 NOTE — ASSESSMENT & PLAN NOTE
Improving  Continues on oral birth control which helps improve occurrence revolving around menstrual cycle  Continues on zonegran, ajovy  Using zomig, diclofenac PRN for abortive purposes  Follows with [facility]

## 2024-07-01 NOTE — ASSESSMENT & PLAN NOTE
Recommended 150-300 minutes weekly exercise  Continue with healthy diet choices  Labs ordered  Recommended monthly self breast examinations  Pap smear due this upcoming year-- deferred to gynecology  Anticipatory guidance given regarding health prevention/wellness, diet/exercise, tobacco/alcohol/drug education, exercise and wellbeing, covid 19 guidance, vaccination recommendations, and sexual health/STD education.   Recommended bi-yearly dental exams and regular vision examinations.

## 2024-07-01 NOTE — PROGRESS NOTES
"Chief Complaint  Annual Exam and migraines (They get extremely bad when on cycle. What can be done about that)    Subjective        Lolis Ruiz presents to [facility]  History of Present Illness  This is a 33 y/o for CPE.     Reports active lifestyle-- likes gardening and doing yard work. Reports trying to follow healthy diet choices.     Denies tobacco use.     Following with Dr. Howe for gynecology needs.   Pap smear UTD 2021   Continues on loestin for help with cyclical migraines. Reports she has had some improvement while being on birth control but still struggling with some migraines. Following with [facility]-- on botox for suppressive properties; continues on ajovy, zonegran; using Zomig/voltaren PRN for break through.     Following with Lolis Wilcox for ADHD management-- on strattera. Denies SI.     UTD with dental exam   Objective   Vital Signs:  /70 (BP Location: Right arm, Patient Position: Sitting, Cuff Size: Adult)   Pulse 78   Temp 97.7 °F (36.5 °C) (Oral)   Resp 18   Ht 144 cm (56.69\")   Wt 44.3 kg (97 lb 9.6 oz)   SpO2 100%   BMI 21.35 kg/m²   Estimated body mass index is 21.35 kg/m² as calculated from the following:    Height as of this encounter: 144 cm (56.69\").    Weight as of this encounter: 44.3 kg (97 lb 9.6 oz).       BMI is within normal parameters. No other follow-up for BMI required.      Physical Exam  Constitutional:       General: She is awake.      Appearance: Normal appearance.   HENT:      Head: Normocephalic and atraumatic.      Right Ear: Hearing, tympanic membrane, ear canal and external ear normal.      Left Ear: Hearing, tympanic membrane, ear canal and external ear normal.      Nose: Nose normal. No congestion.      Mouth/Throat:      Lips: Pink.      Mouth: Mucous membranes are moist.      Pharynx: Oropharynx is clear. No oropharyngeal exudate.   Eyes:      Extraocular Movements: Extraocular movements intact.      " Conjunctiva/sclera: Conjunctivae normal.      Pupils: Pupils are equal, round, and reactive to light.   Neck:      Vascular: No carotid bruit.   Cardiovascular:      Rate and Rhythm: Normal rate and regular rhythm.      Pulses: Normal pulses.      Heart sounds: Normal heart sounds. No murmur heard.     No gallop.   Pulmonary:      Effort: Pulmonary effort is normal. No respiratory distress.      Breath sounds: Normal breath sounds. No wheezing or rales.   Abdominal:      General: Abdomen is flat. Bowel sounds are normal. There is no distension.      Palpations: Abdomen is soft.      Tenderness: There is no abdominal tenderness.   Musculoskeletal:         General: No swelling. Normal range of motion.      Cervical back: Normal range of motion and neck supple.   Lymphadenopathy:      Cervical: No cervical adenopathy.   Skin:     General: Skin is warm and dry.      Capillary Refill: Capillary refill takes less than 2 seconds.      Coloration: Skin is not jaundiced.   Neurological:      General: No focal deficit present.      Mental Status: She is alert and oriented to person, place, and time. Mental status is at baseline.      Motor: Motor function is intact.      Coordination: Coordination is intact.      Gait: Gait is intact.      Deep Tendon Reflexes: Reflexes are normal and symmetric.   Psychiatric:         Attention and Perception: Attention normal.         Mood and Affect: Mood normal.         Speech: Speech normal.         Behavior: Behavior normal. Behavior is cooperative.         Thought Content: Thought content normal.         Cognition and Memory: Cognition normal.         Judgment: Judgment normal.        Result Review :    The following data was reviewed by: CIERRA Haas on 07/01/2024:    Tobacco Use: Low Risk  (7/1/2024)    Patient History     Smoking Tobacco Use: Never     Smokeless Tobacco Use: Never     Passive Exposure: Not on file     Social History     Substance and Sexual Activity   Alcohol  Use Yes    Alcohol/week: 1.0 standard drink of alcohol    Types: 1 Glasses of wine per week    Comment: Less than 1 glass per week     Family History   Problem Relation Age of Onset    Alcohol abuse Father     Hypertension Mother     Anxiety disorder Mother     No Known Problems Brother     No Known Problems Sister     No Known Problems Son     No Known Problems Daughter     Colon cancer Paternal Grandmother     Breast cancer Maternal Grandmother     Diabetes Maternal Grandfather     Thyroid disease Maternal Aunt     No Known Problems Paternal Aunt                   Assessment and Plan     Diagnoses and all orders for this visit:    1. Annual physical exam (Primary)  Assessment & Plan:  Recommended 150-300 minutes weekly exercise  Continue with healthy diet choices  Labs ordered  Recommended monthly self breast examinations  Pap smear due this upcoming year-- deferred to gynecology  Anticipatory guidance given regarding health prevention/wellness, diet/exercise, tobacco/alcohol/drug education, exercise and wellbeing, covid 19 guidance, vaccination recommendations, and sexual health/STD education.   Recommended bi-yearly dental exams and regular vision examinations.       Orders:  -     CBC & Differential  -     Comprehensive metabolic panel  -     TSH Rfx On Abnormal To Free T4  -     Hemoglobin A1c  -     Lipid panel    2. Other migraine without status migrainosus, intractable  Assessment & Plan:  Improving  Continues on oral birth control which helps improve occurrence revolving around menstrual cycle  Continues on zonegran, ajovy  Using zomig, diclofenac PRN for abortive purposes  [facility]             3. Attention deficit hyperactivity disorder (ADHD), predominantly inattentive type  Assessment & Plan:  Following with CIERRA Fan  Continues on strattera                 Follow Up     Return in about 1 year (around 7/1/2025) for Annual physical.  Patient was given instructions and  counseling regarding her condition or for health maintenance advice. Please see specific information pulled into the AVS if appropriate.

## 2024-07-02 LAB
ALBUMIN SERPL-MCNC: 4.1 G/DL (ref 3.9–4.9)
ALP SERPL-CCNC: 48 IU/L (ref 44–121)
ALT SERPL-CCNC: 14 IU/L (ref 0–32)
AST SERPL-CCNC: 19 IU/L (ref 0–40)
BASOPHILS # BLD AUTO: 0 X10E3/UL (ref 0–0.2)
BASOPHILS NFR BLD AUTO: 0 %
BILIRUB SERPL-MCNC: 0.3 MG/DL (ref 0–1.2)
BUN SERPL-MCNC: 8 MG/DL (ref 6–20)
BUN/CREAT SERPL: 10 (ref 9–23)
CALCIUM SERPL-MCNC: 9 MG/DL (ref 8.7–10.2)
CHLORIDE SERPL-SCNC: 106 MMOL/L (ref 96–106)
CHOLEST SERPL-MCNC: 196 MG/DL (ref 100–199)
CO2 SERPL-SCNC: 21 MMOL/L (ref 20–29)
CREAT SERPL-MCNC: 0.78 MG/DL (ref 0.57–1)
EGFRCR SERPLBLD CKD-EPI 2021: 102 ML/MIN/1.73
EOSINOPHIL # BLD AUTO: 0 X10E3/UL (ref 0–0.4)
EOSINOPHIL NFR BLD AUTO: 1 %
ERYTHROCYTE [DISTWIDTH] IN BLOOD BY AUTOMATED COUNT: 12.8 % (ref 11.7–15.4)
GLOBULIN SER CALC-MCNC: 2.2 G/DL (ref 1.5–4.5)
GLUCOSE SERPL-MCNC: 82 MG/DL (ref 70–99)
HBA1C MFR BLD: 5.5 % (ref 4.8–5.6)
HCT VFR BLD AUTO: 39.3 % (ref 34–46.6)
HDLC SERPL-MCNC: 57 MG/DL
HGB BLD-MCNC: 13 G/DL (ref 11.1–15.9)
IMM GRANULOCYTES # BLD AUTO: 0 X10E3/UL (ref 0–0.1)
IMM GRANULOCYTES NFR BLD AUTO: 0 %
LDLC SERPL CALC-MCNC: 126 MG/DL (ref 0–99)
LYMPHOCYTES # BLD AUTO: 1.5 X10E3/UL (ref 0.7–3.1)
LYMPHOCYTES NFR BLD AUTO: 28 %
MCH RBC QN AUTO: 27.8 PG (ref 26.6–33)
MCHC RBC AUTO-ENTMCNC: 33.1 G/DL (ref 31.5–35.7)
MCV RBC AUTO: 84 FL (ref 79–97)
MONOCYTES # BLD AUTO: 0.4 X10E3/UL (ref 0.1–0.9)
MONOCYTES NFR BLD AUTO: 8 %
NEUTROPHILS # BLD AUTO: 3.3 X10E3/UL (ref 1.4–7)
NEUTROPHILS NFR BLD AUTO: 63 %
PLATELET # BLD AUTO: 261 X10E3/UL (ref 150–450)
POTASSIUM SERPL-SCNC: 3.8 MMOL/L (ref 3.5–5.2)
PROT SERPL-MCNC: 6.3 G/DL (ref 6–8.5)
RBC # BLD AUTO: 4.67 X10E6/UL (ref 3.77–5.28)
SODIUM SERPL-SCNC: 138 MMOL/L (ref 134–144)
TRIGL SERPL-MCNC: 73 MG/DL (ref 0–149)
TSH SERPL DL<=0.005 MIU/L-ACNC: 2.08 UIU/ML (ref 0.45–4.5)
VLDLC SERPL CALC-MCNC: 13 MG/DL (ref 5–40)
WBC # BLD AUTO: 5.3 X10E3/UL (ref 3.4–10.8)

## 2024-08-20 ENCOUNTER — TELEPHONE (OUTPATIENT)
Dept: OBSTETRICS AND GYNECOLOGY | Age: 34
End: 2024-08-20
Payer: COMMERCIAL

## 2024-08-20 NOTE — TELEPHONE ENCOUNTER
[facility] stating they need refills sent for pt's BC pill. Pt last seen in office on 3/14/24 & next appt scheduled 3/20/25. Please advise.

## 2025-04-07 ENCOUNTER — OFFICE VISIT (OUTPATIENT)
Dept: OBSTETRICS AND GYNECOLOGY | Age: 35
End: 2025-04-07
Payer: COMMERCIAL

## 2025-04-07 VITALS
WEIGHT: 102 LBS | HEIGHT: 57 IN | SYSTOLIC BLOOD PRESSURE: 104 MMHG | BODY MASS INDEX: 22.01 KG/M2 | DIASTOLIC BLOOD PRESSURE: 62 MMHG

## 2025-04-07 DIAGNOSIS — Z01.419 WELL FEMALE EXAM WITH ROUTINE GYNECOLOGICAL EXAM: ICD-10-CM

## 2025-04-07 DIAGNOSIS — Z01.419 ENCOUNTER FOR GYNECOLOGICAL EXAMINATION WITHOUT ABNORMAL FINDING: Primary | ICD-10-CM

## 2025-04-07 DIAGNOSIS — Z12.4 SCREENING FOR MALIGNANT NEOPLASM OF CERVIX: ICD-10-CM

## 2025-04-07 DIAGNOSIS — Z11.51 SCREENING FOR HUMAN PAPILLOMAVIRUS (HPV): ICD-10-CM

## 2025-04-07 PROCEDURE — 99395 PREV VISIT EST AGE 18-39: CPT | Performed by: OBSTETRICS & GYNECOLOGY

## 2025-04-07 RX ORDER — IBUPROFEN 200 MG
200 TABLET ORAL EVERY 6 HOURS PRN
COMMUNITY

## 2025-04-07 RX ORDER — ELETRIPTAN HYDROBROMIDE 20 MG/1
TABLET, FILM COATED ORAL
COMMUNITY

## 2025-04-07 RX ORDER — PROPRANOLOL HYDROCHLORIDE 10 MG/1
TABLET ORAL
COMMUNITY
Start: 2024-10-25 | End: 2025-06-01

## 2025-04-07 RX ORDER — CETIRIZINE HYDROCHLORIDE 5 MG/1
5 TABLET ORAL 2 TIMES DAILY
COMMUNITY

## 2025-04-07 RX ORDER — PROCHLORPERAZINE 25 MG
SUPPOSITORY, RECTAL RECTAL
COMMUNITY

## 2025-04-07 RX ORDER — LEVONORGESTREL/ETHIN.ESTRADIOL 0.1-0.02MG
1 TABLET ORAL DAILY
Qty: 84 TABLET | Refills: 3 | Status: SHIPPED | OUTPATIENT
Start: 2025-04-07 | End: 2026-04-07

## 2025-04-07 NOTE — PROGRESS NOTES
Routine Annual Visit    2025    Patient: Lolis Ruiz          MR#:[id number]      Chief Complaint   Patient presents with    Gynecologic Exam     Annual Exam - last pap 21 neg, Pt c/o low libido        History of Present Illness    34 y.o. female  who presents for annual exam.       Patient is feeling well  She has no periods with the birth control pill  Her migraines are under good control now  The only problem is decreased libido  She does have intercourse about 3 times per month but only 1 and 3 does she feel interested  Kids are 10 and 17 years old  Pap smear is due  She is a homemaker  Her oldest is a girl and will go to [employer]    No LMP recorded. (Menstrual status: Oral contraceptives).  Obstetric History:  OB History          2    Para   2    Term   2            AB        Living   2         SAB        IAB        Ectopic        Molar        Multiple        Live Births   2               Menstrual History:     No LMP recorded. (Menstrual status: Oral contraceptives).       Sexual History:       ________________________________________  Patient Active Problem List   Diagnosis    Migraine    Chiari I malformation    Trauma in childhood    Anemia    Daytime somnolence    Chiari malformation type I    Annual physical exam    Attention deficit hyperactivity disorder (ADHD), predominantly inattentive type       Past Medical History:   Diagnosis Date    Abnormal ECG     ADHD     Asthma     Catscratch disease     History of medical problems     Chiari 1 malformation.  Hemangiomas on liver.  SVT'S, PVC'S, PAC'S, mild regurgitation at mitral and tricuspid valves    Kidney stone     Liver lesion     Low back pain     Spondylolysis L4    Migraine     Multiple gestation     Pott's disease     Spondylolysis     Vision disorder 10/01/2023    vertical misalignment       Past Surgical History:   Procedure Laterality Date     SECTION      EYE SURGERY  2019    Lasik    WISDOM  "TOOTH EXTRACTION  2011       Social History     Tobacco Use   Smoking Status Never   Smokeless Tobacco Never       has a current medication list which includes the following prescription(s): acetaminophen, cetirizine, diclofenac, eletriptan, ajovy, ibuprofen, onabotulinumtoxina, onabotulinumtoxina 5 Units/0.1 mL in Sodium Chloride (PF) injection injection, prochlorperazine, propranolol, and levonorgestrel-ethinyl estradiol.  ________________________________________    Current contraception: vasectomy  History of abnormal Pap smear: no  Family history of Breast cancer: MGM        The following portions of the patient's history were reviewed and updated as appropriate: allergies, current medications, past family history, past medical history, past social history, past surgical history, and problem list.    Review of Systems    Pertinent items are noted in HPI.     Objective   Physical Exam    /62 (BP Location: Left arm, Patient Position: Sitting)   Ht 144 cm (56.69\")   Wt 46.3 kg (102 lb)   BMI 22.31 kg/m²    BP Readings from Last 3 Encounters:   04/07/25 104/62   07/01/24 102/70   03/14/24 108/72      Wt Readings from Last 3 Encounters:   04/07/25 46.3 kg (102 lb)   07/01/24 44.3 kg (97 lb 9.6 oz)   03/14/24 41 kg (90 lb 6.4 oz)      BMI: Estimated body mass index is 22.31 kg/m² as calculated from the following:    Height as of this encounter: 144 cm (56.69\").    Weight as of this encounter: 46.3 kg (102 lb).      General:   alert, appears stated age, and cooperative   Abdomen: soft, non-tender, without masses or organomegaly   Breast: inspection negative, no nipple discharge or bleeding, no masses or nodularity palpable   Vulva: normal, Bartholin's, Urethra, Landess's normal   Vagina: normal mucosa   Cervix: no cervical motion tenderness and no lesions   Uterus: normal size, mobile, and non-tender   Adnexa: no mass, fullness, tenderness     Assessment:    1. Normal annual exam   Assessment     ICD-10-CM " ICD-9-CM   1. Encounter for gynecological examination without abnormal finding  Z01.419 V72.31   2. Well female exam with routine gynecological exam  Z01.419 V72.31   3. Screening for human papillomavirus (HPV)  Z11.51 V73.81   4. Screening for malignant neoplasm of cervix  Z12.4 V76.2     Plan:    Plan       [x]  PAP done  []  Labs:   []  GC/Chl/TV          Diagnoses and all orders for this visit:    1. Encounter for gynecological examination without abnormal finding (Primary)    2. Well female exam with routine gynecological exam  -     IGP, Apt HPV,rfx 16 / 18,45    3. Screening for human papillomavirus (HPV)  -     IGP, Apt HPV,rfx 16 / 18,45    4. Screening for malignant neoplasm of cervix  -     IGP, Apt HPV,rfx 16 / 18,45    Other orders  -     levonorgestrel-ethinyl estradiol (AVIANE,ALESSE,LESSINA) 0.1-20 MG-MCG per tablet; Take 1 tablet by mouth Daily.  Dispense: 84 tablet; Refill: 3      Changed her birth control pill to a different progesterone to see if the profile will be more favorable for libido          Counseling:  --Nutrition: Stressed importance of moderation and caloric balance, stressed fresh fruit and vegetables  --Exercise: Stressed the importance of regular exercise. 3-5 times weekly       --Discussed pap smear screening recommendations

## 2025-04-10 LAB
CYTOLOGIST CVX/VAG CYTO: NORMAL
CYTOLOGY CVX/VAG DOC CYTO: NORMAL
CYTOLOGY CVX/VAG DOC THIN PREP: NORMAL
DX ICD CODE: NORMAL
HPV I/H RISK 4 DNA CVX QL PROBE+SIG AMP: NEGATIVE
OTHER STN SPEC: NORMAL
SERVICE CMNT-IMP: NORMAL
STAT OF ADQ CVX/VAG CYTO-IMP: NORMAL

## 2025-07-02 ENCOUNTER — OFFICE VISIT (OUTPATIENT)
Dept: INTERNAL MEDICINE | Facility: CLINIC | Age: 35
End: 2025-07-02
Payer: COMMERCIAL

## 2025-07-02 VITALS
OXYGEN SATURATION: 98 % | HEIGHT: 57 IN | SYSTOLIC BLOOD PRESSURE: 122 MMHG | DIASTOLIC BLOOD PRESSURE: 82 MMHG | WEIGHT: 98.4 LBS | TEMPERATURE: 96.9 F | BODY MASS INDEX: 21.23 KG/M2 | HEART RATE: 77 BPM

## 2025-07-02 DIAGNOSIS — G90.1 DYSAUTONOMIA: ICD-10-CM

## 2025-07-02 DIAGNOSIS — R56.9 SEIZURE-LIKE ACTIVITY: ICD-10-CM

## 2025-07-02 DIAGNOSIS — Z00.00 ANNUAL PHYSICAL EXAM: Primary | ICD-10-CM

## 2025-07-02 DIAGNOSIS — G93.5 CHIARI I MALFORMATION: ICD-10-CM

## 2025-07-02 DIAGNOSIS — G43.819 OTHER MIGRAINE WITHOUT STATUS MIGRAINOSUS, INTRACTABLE: ICD-10-CM

## 2025-07-02 DIAGNOSIS — F32.A ANXIETY AND DEPRESSION: ICD-10-CM

## 2025-07-02 DIAGNOSIS — F41.9 ANXIETY AND DEPRESSION: ICD-10-CM

## 2025-07-02 LAB
ALBUMIN SERPL-MCNC: 4.5 G/DL (ref 3.5–5.2)
ALBUMIN/GLOB SERPL: 1.9 G/DL
ALP SERPL-CCNC: 54 U/L (ref 39–117)
ALT SERPL-CCNC: 14 U/L (ref 1–33)
AST SERPL-CCNC: 18 U/L (ref 1–32)
BASOPHILS # BLD AUTO: 0.02 10*3/MM3 (ref 0–0.2)
BASOPHILS NFR BLD AUTO: 0.2 % (ref 0–1.5)
BILIRUB SERPL-MCNC: 0.4 MG/DL (ref 0–1.2)
BUN SERPL-MCNC: 9 MG/DL (ref 6–20)
BUN/CREAT SERPL: 10.3 (ref 7–25)
CALCIUM SERPL-MCNC: 9.4 MG/DL (ref 8.6–10.5)
CHLORIDE SERPL-SCNC: 104 MMOL/L (ref 98–107)
CHOLEST SERPL-MCNC: 197 MG/DL (ref 0–200)
CO2 SERPL-SCNC: 24.6 MMOL/L (ref 22–29)
CREAT SERPL-MCNC: 0.87 MG/DL (ref 0.57–1)
EGFRCR SERPLBLD CKD-EPI 2021: 88.7 ML/MIN/1.73
EOSINOPHIL # BLD AUTO: 0.04 10*3/MM3 (ref 0–0.4)
EOSINOPHIL NFR BLD AUTO: 0.5 % (ref 0.3–6.2)
ERYTHROCYTE [DISTWIDTH] IN BLOOD BY AUTOMATED COUNT: 12.6 % (ref 12.3–15.4)
GLOBULIN SER CALC-MCNC: 2.4 GM/DL
GLUCOSE SERPL-MCNC: 80 MG/DL (ref 65–99)
HBA1C MFR BLD: 5 % (ref 4.8–5.6)
HCT VFR BLD AUTO: 45.1 % (ref 34–46.6)
HDLC SERPL-MCNC: 54 MG/DL (ref 40–60)
HGB BLD-MCNC: 14.2 G/DL (ref 12–15.9)
IMM GRANULOCYTES # BLD AUTO: 0.03 10*3/MM3 (ref 0–0.05)
IMM GRANULOCYTES NFR BLD AUTO: 0.4 % (ref 0–0.5)
LDLC SERPL CALC-MCNC: 123 MG/DL (ref 0–100)
LYMPHOCYTES # BLD AUTO: 1.82 10*3/MM3 (ref 0.7–3.1)
LYMPHOCYTES NFR BLD AUTO: 21.8 % (ref 19.6–45.3)
MAGNESIUM SERPL-MCNC: 2.1 MG/DL (ref 1.6–2.6)
MCH RBC QN AUTO: 27.7 PG (ref 26.6–33)
MCHC RBC AUTO-ENTMCNC: 31.5 G/DL (ref 31.5–35.7)
MCV RBC AUTO: 88.1 FL (ref 79–97)
MONOCYTES # BLD AUTO: 0.41 10*3/MM3 (ref 0.1–0.9)
MONOCYTES NFR BLD AUTO: 4.9 % (ref 5–12)
NEUTROPHILS # BLD AUTO: 6.01 10*3/MM3 (ref 1.7–7)
NEUTROPHILS NFR BLD AUTO: 72.2 % (ref 42.7–76)
NRBC BLD AUTO-RTO: 0 /100 WBC (ref 0–0.2)
PLATELET # BLD AUTO: 316 10*3/MM3 (ref 140–450)
POTASSIUM SERPL-SCNC: 4.3 MMOL/L (ref 3.5–5.2)
PROT SERPL-MCNC: 6.9 G/DL (ref 6–8.5)
RBC # BLD AUTO: 5.12 10*6/MM3 (ref 3.77–5.28)
SODIUM SERPL-SCNC: 139 MMOL/L (ref 136–145)
TRIGL SERPL-MCNC: 111 MG/DL (ref 0–150)
TSH SERPL DL<=0.005 MIU/L-ACNC: 2.09 UIU/ML (ref 0.27–4.2)
VLDLC SERPL CALC-MCNC: 20 MG/DL (ref 5–40)
WBC # BLD AUTO: 8.33 10*3/MM3 (ref 3.4–10.8)

## 2025-07-02 PROCEDURE — 99395 PREV VISIT EST AGE 18-39: CPT | Performed by: NURSE PRACTITIONER

## 2025-07-02 RX ORDER — PROPRANOLOL HYDROCHLORIDE 10 MG/1
10 TABLET ORAL 3 TIMES DAILY
COMMUNITY

## 2025-07-02 RX ORDER — NORETHINDRONE ACETATE AND ETHINYL ESTRADIOL AND FERROUS FUMARATE 1MG-20(24)
KIT ORAL
COMMUNITY
Start: 2025-07-01 | End: 2025-07-03

## 2025-07-02 RX ORDER — FROVATRIPTAN SUCCINATE 2.5 MG/1
2.5 TABLET, FILM COATED ORAL
COMMUNITY
Start: 2025-05-20

## 2025-07-02 NOTE — ASSESSMENT & PLAN NOTE
Current recommendations according to the current Physical Activity Guidelines for Americans: adults need 150-300 minutes of physical exercise weekly. It is also recommended to perform two sessions of full body strength training exercise weekly which includes all major muscle groups including legs, hips, back, abdomen, chest, shoulders, and arms.   Current CDC recommendations for diet include following a diet that emphasizes fruits, vegetables, whole grains that is low in saturated fats and low in sugar intake.   Adults should consume at least 3 cup equivalents of fruit and vegetables daily. It is also beneficial to get 25 grams of fiber daily unless told otherwise by your healthcare provider.   Labs today   Pap smear UTD 2025  Anticipatory guidance given regarding health prevention/wellness, diet/exercise, tobacco/alcohol/drug education, exercise and wellbeing, vaccination recommendations, and sexual health/STD education.   Recommended bi-yearly dental exams and regular vision examinations.

## 2025-07-02 NOTE — PROGRESS NOTES
"Chief Complaint  Annual Exam    Subjective        Lolis Ruiz presents to [facility]  History of Present Illness  This is a 34 y/o female presenting to office for CPE, recent health issues    Reports intermittent regular exercise -- she joined a gym    Pap smear UTD 2025  Starting aurovela-- reports her gynecologist had tried to switch her to progestin more based BC but her migraines had worsened    She had recent ER visit on 6/30 with concerns of possible seizure activity-- was recommended close f/u with neurology; scheduled for f/u 7/3  Reports waking up on 6/30 with \"full body convulsions\" for 30 seconds; reports she felt normal after this occurred but was having these off and on; reports that this has since only expressed itself in left leg twitching.   Continues on botox, triptans, ajovy, propanolol for migraine management    Following with [facility] Fan for behavioral health needs-- continues on prozac 20mg for hx anxiety and depression; denies any SI.     UTD with dental exam   Objective   Vital Signs:  /82 (BP Location: Left arm, Patient Position: Sitting, Cuff Size: Adult)   Pulse 77   Temp 96.9 °F (36.1 °C) (Temporal)   Ht 144 cm (56.69\")   Wt 44.6 kg (98 lb 6.4 oz)   SpO2 98%   BMI 21.52 kg/m²   Estimated body mass index is 21.52 kg/m² as calculated from the following:    Height as of this encounter: 144 cm (56.69\").    Weight as of this encounter: 44.6 kg (98 lb 6.4 oz).    BMI is within normal parameters. No other follow-up for BMI required.      Physical Exam  Constitutional:       General: She is awake.      Appearance: Normal appearance.   HENT:      Head: Normocephalic and atraumatic.      Right Ear: Hearing, tympanic membrane, ear canal and external ear normal.      Left Ear: Hearing, tympanic membrane, ear canal and external ear normal.      Nose: Nose normal.      Mouth/Throat:      Lips: Pink.      Mouth: Mucous membranes are moist.      " Pharynx: Oropharynx is clear.   Eyes:      Extraocular Movements: Extraocular movements intact.      Conjunctiva/sclera: Conjunctivae normal.      Pupils: Pupils are equal, round, and reactive to light.   Cardiovascular:      Rate and Rhythm: Normal rate and regular rhythm.      Pulses: Normal pulses.      Heart sounds: Normal heart sounds. No murmur heard.     No gallop.   Pulmonary:      Effort: Pulmonary effort is normal. No respiratory distress.      Breath sounds: Normal breath sounds. No wheezing or rales.   Abdominal:      General: Abdomen is flat. Bowel sounds are normal. There is no distension.      Palpations: Abdomen is soft.      Tenderness: There is no abdominal tenderness.   Musculoskeletal:         General: Normal range of motion.      Cervical back: Normal range of motion and neck supple.   Skin:     General: Skin is warm and dry.      Capillary Refill: Capillary refill takes less than 2 seconds.   Neurological:      General: No focal deficit present.      Mental Status: She is alert and oriented to person, place, and time. Mental status is at baseline.      Motor: Motor function is intact.      Coordination: Coordination is intact.      Gait: Gait is intact.      Deep Tendon Reflexes: Reflexes are normal and symmetric.   Psychiatric:         Attention and Perception: Attention normal.         Mood and Affect: Mood normal.         Speech: Speech normal.         Behavior: Behavior normal. Behavior is cooperative.         Thought Content: Thought content normal.         Cognition and Memory: Cognition normal.         Judgment: Judgment normal.        Result Review :  The following data was reviewed by: CIERRA Haas on 07/02/2025:    Tobacco Use: Low Risk  (7/2/2025)    Patient History     Smoking Tobacco Use: Never     Smokeless Tobacco Use: Never     Passive Exposure: Not on file     Social History     Substance and Sexual Activity   Alcohol Use Not Currently    Alcohol/week: 1.0 standard drink  of alcohol     Family History   Problem Relation Age of Onset    Alcohol abuse Father     Hypertension Mother     Anxiety disorder Mother     Arthritis Mother     Depression Mother     Hyperlipidemia Mother     Migraines Mother     No Known Problems Brother     No Known Problems Sister     No Known Problems Son     No Known Problems Daughter     Colon cancer Paternal Grandmother     Breast cancer Maternal Grandmother     Diabetes Maternal Grandfather     Thyroid disease Maternal Aunt     No Known Problems Paternal Aunt                Assessment and Plan   Diagnoses and all orders for this visit:    1. Annual physical exam (Primary)  Assessment & Plan:  Current recommendations according to the current Physical Activity Guidelines for Americans: adults need 150-300 minutes of physical exercise weekly. It is also recommended to perform two sessions of full body strength training exercise weekly which includes all major muscle groups including legs, hips, back, abdomen, chest, shoulders, and arms.   Current CDC recommendations for diet include following a diet that emphasizes fruits, vegetables, whole grains that is low in saturated fats and low in sugar intake.   Adults should consume at least 3 cup equivalents of fruit and vegetables daily. It is also beneficial to get 25 grams of fiber daily unless told otherwise by your healthcare provider.   Labs today   Pap smear UTD 2025  Anticipatory guidance given regarding health prevention/wellness, diet/exercise, tobacco/alcohol/drug education, exercise and wellbeing, vaccination recommendations, and sexual health/STD education.   Recommended bi-yearly dental exams and regular vision examinations.       Orders:  -     Comprehensive Metabolic Panel  -     CBC & Differential  -     Hemoglobin A1c  -     Lipid Panel  -     TSH Rfx On Abnormal To Free T4  -     Magnesium    2. Other migraine without status migrainosus, intractable  Assessment & Plan:  Continues following with  [facility]  Continues on botox, ajovy, propanolol  Using triptans PRN        3. Chiari I malformation  Assessment & Plan:  \"No role for further surgical evaluation or intervention for cerebellar ectopia \"  Per Dr. Little; prev seen by neurosx      4. Anxiety and depression  Assessment & Plan:  Following with behavioral health  Continues on prozac  Denies SI      5. Seizure-like activity  Assessment & Plan:  Pending neuro f/u 7/3      6. Dysautonomia  Assessment & Plan:  Continues following with [facility]   Continues on propanolol as prescribed               Follow Up   Return in about 1 year (around 7/2/2026) for Annual physical.  Patient was given instructions and counseling regarding her condition or for health maintenance advice. Please see specific information pulled into the AVS if appropriate.             "

## 2025-07-02 NOTE — ASSESSMENT & PLAN NOTE
Continues following with [facility]  Continues on botox, ajovy, propanolol  Using triptans PRN

## 2025-07-02 NOTE — ASSESSMENT & PLAN NOTE
"\"No role for further surgical evaluation or intervention for cerebellar ectopia \"  Per Dr. Little; prev seen by neurosx  "

## 2025-07-03 RX ORDER — NORETHINDRONE 0.35 MG/1
1 TABLET ORAL DAILY
Qty: 28 TABLET | Refills: 3 | Status: SHIPPED | OUTPATIENT
Start: 2025-07-03 | End: 2026-07-03